# Patient Record
Sex: FEMALE | Race: WHITE
[De-identification: names, ages, dates, MRNs, and addresses within clinical notes are randomized per-mention and may not be internally consistent; named-entity substitution may affect disease eponyms.]

---

## 2018-06-19 ENCOUNTER — HOSPITAL ENCOUNTER (INPATIENT)
Dept: HOSPITAL 36 - ER | Age: 53
LOS: 7 days | Discharge: HOME | DRG: 384 | End: 2018-06-26
Attending: INTERNAL MEDICINE | Admitting: INTERNAL MEDICINE
Payer: MEDICAID

## 2018-06-19 DIAGNOSIS — I47.1: ICD-10-CM

## 2018-06-19 DIAGNOSIS — D68.9: ICD-10-CM

## 2018-06-19 DIAGNOSIS — M62.82: ICD-10-CM
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
## 2018-06-19 DIAGNOSIS — K72.90: ICD-10-CM

## 2018-06-19 DIAGNOSIS — Z91.041: ICD-10-CM

## 2018-06-19 DIAGNOSIS — D69.6: ICD-10-CM
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
## 2018-06-19 DIAGNOSIS — D61.818: ICD-10-CM

## 2018-06-19 DIAGNOSIS — Z91.19: ICD-10-CM

## 2018-06-19 DIAGNOSIS — M19.90: ICD-10-CM

## 2018-06-19 DIAGNOSIS — F10.239: ICD-10-CM

## 2018-06-19 DIAGNOSIS — Z85.05: ICD-10-CM
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
## 2018-06-19 DIAGNOSIS — E87.6: ICD-10-CM

## 2018-06-19 DIAGNOSIS — E72.20: ICD-10-CM

## 2018-06-19 DIAGNOSIS — S01.01XA: Primary | ICD-10-CM

## 2018-06-19 DIAGNOSIS — D64.9: ICD-10-CM

## 2018-06-19 DIAGNOSIS — C22.8: ICD-10-CM
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
## 2018-06-19 DIAGNOSIS — R25.1: ICD-10-CM

## 2018-06-19 DIAGNOSIS — G40.909: ICD-10-CM

## 2018-06-19 DIAGNOSIS — K70.31: ICD-10-CM
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
## 2018-06-19 LAB
ALBUMIN SERPL-MCNC: 4 GM/DL (ref 3.7–5.3)
ALBUMIN/GLOB SERPL: 0.9 {RATIO} (ref 1–1.8)
ALP SERPL-CCNC: 319 U/L (ref 34–104)
ALT SERPL-CCNC: 43 U/L (ref 7–52)
ANION GAP SERPL CALC-SCNC: 25.1 MMOL/L (ref 7–16)
ANISOCYTOSIS BLD QL SMEAR: (no result)
APPEARANCE UR: CLEAR
AST SERPL-CCNC: 190 U/L (ref 13–39)
BACTERIA #/AREA URNS HPF: (no result) /HPF
BILIRUB SERPL-MCNC: 4.9 MG/DL (ref 0.3–1)
BILIRUB UR-MCNC: NEGATIVE MG/DL
BUN SERPL-MCNC: 7 MG/DL (ref 7–25)
CALCIUM SERPL-MCNC: 9.5 MG/DL (ref 8.6–10.3)
CHLORIDE SERPL-SCNC: 94 MEQ/L (ref 98–107)
CK SERPL-CCNC: 480 U/L (ref 30–223)
CK SERPL-CCNC: 584 U/L (ref 30–223)
CO2 SERPL-SCNC: 20.4 MEQ/L (ref 21–31)
COLOR UR: YELLOW
CREAT SERPL-MCNC: 0.7 MG/DL (ref 0.6–1.2)
EPI CELLS URNS QL MICRO: (no result) /LPF
ERYTHROCYTE [DISTWIDTH] IN BLOOD BY AUTOMATED COUNT: 13.9 % (ref 11.5–20)
GLOBULIN SER-MCNC: 4.7 GM/DL
GLUCOSE SERPL-MCNC: 162 MG/DL (ref 70–105)
GLUCOSE UR STRIP-MCNC: NEGATIVE MG/DL
HBA1C MFR BLD: < 4 % (ref 4–6)
HCT VFR BLD CALC: 35.6 % (ref 41–60)
HGB BLD-MCNC: 11.9 GM/DL (ref 12–16)
HGB BLD-MCNC: 13 G/DL (ref 4–35)
INR PPP: 1.57 (ref 0.5–1.4)
KETONES UR STRIP-MCNC: NEGATIVE MG/DL
LEUKOCYTE ESTERASE UR-ACNC: NEGATIVE
LYMPHOCYTES # BLD MANUAL: 11 % (ref 20–50)
MACROCYTES BLD QL SMEAR: (no result)
MANUAL DIFFERENTIAL PERFORMED BLD QL: YES
MCH RBC QN AUTO: 35.4 PG (ref 27–31)
MCHC RBC AUTO-ENTMCNC: 33.3 PG (ref 28–36)
MCV RBC AUTO: 106.3 FL (ref 81–100)
MICRO URNS: YES
NEUTROPHILS NFR BLD AUTO: 86 % (ref 40–80)
NEUTS BAND NFR BLD: 3 % (ref 0–10)
NITRITE UR QL STRIP: NEGATIVE
PH UR STRIP: 8 [PH] (ref 4.6–8)
PLATELET # BLD EST: (no result) 10*3/UL
PLATELET # BLD: 81 TH/CMM (ref 150–400)
PMV BLD AUTO: 6.6 FL
POTASSIUM SERPL-SCNC: 3.5 MEQ/L (ref 3.5–5.1)
PROT UR STRIP-MCNC: (no result) MG/DL
PROTHROMBIN TIME: 16.7 SECONDS (ref 9.5–11.5)
RBC # BLD AUTO: 3.35 MIL/CMM (ref 3.8–5.1)
RBC # UR STRIP: (no result) /UL
RBC #/AREA URNS HPF: (no result) /HPF (ref 0–5)
SODIUM SERPL-SCNC: 136 MEQ/L (ref 136–145)
SP GR UR STRIP: 1.01 (ref 1–1.03)
TOTAL CELLS COUNTED BLD: 100
URINALYSIS COMPLETE PNL UR: (no result)
UROBILINOGEN UR STRIP-ACNC: 0.2 E.U./DL (ref 0.2–1)
WBC # BLD AUTO: 9.6 TH/CMM (ref 4.8–10.8)
WBC #/AREA URNS HPF: (no result) /HPF (ref 0–5)

## 2018-06-19 PROCEDURE — X6488: HCPCS

## 2018-06-19 PROCEDURE — X3904: HCPCS

## 2018-06-19 PROCEDURE — 0HQ0XZZ REPAIR SCALP SKIN, EXTERNAL APPROACH: ICD-10-PCS | Performed by: GENERAL PRACTICE

## 2018-06-19 PROCEDURE — Z7610: HCPCS

## 2018-06-19 RX ADMIN — DEXTROSE AND SODIUM CHLORIDE SCH MLS/HR: 5; .9 INJECTION, SOLUTION INTRAVENOUS at 09:53

## 2018-06-19 RX ADMIN — LACTULOSE SCH GM: 20 SOLUTION ORAL at 17:59

## 2018-06-19 RX ADMIN — HYDROCODONE BITARTRATE AND ACETAMINOPHEN PRN TAB: 5; 325 TABLET ORAL at 17:58

## 2018-06-19 RX ADMIN — HYDROCODONE BITARTRATE AND ACETAMINOPHEN PRN TAB: 5; 325 TABLET ORAL at 08:50

## 2018-06-19 RX ADMIN — LACTULOSE SCH GM: 20 SOLUTION ORAL at 10:19

## 2018-06-19 RX ADMIN — DEXTROSE AND SODIUM CHLORIDE SCH MLS/HR: 5; .9 INJECTION, SOLUTION INTRAVENOUS at 22:44

## 2018-06-19 RX ADMIN — Medication SCH TAB: at 10:19

## 2018-06-19 NOTE — HISTORY & PHYSICAL
ADMIT DATE:  



PATIENT IDENTIFICATION:  A 53-year-old female.



CHIEF COMPLAINT:  Altered level of consciousness.



HISTORY OF PRESENT ILLNESS:  A 52-year-old  female with history of

alcoholic cirrhosis and hepatocellular carcinoma, status post chemotherapy, has

history of alcohol withdrawal seizure as well.  She was brought into the

Emergency Room by neighbor after the patient was noted to have a right temporal

scalp laceration.  The patient was evaluated by Emergency Room MD and noted to

have ____ associated with elevated ammonia level ____, the patient was advised

to be admitted.



PAST MEDICAL HISTORY:  Remarkable for:

1.  Cirrhosis of liver.

2.  Alcohol abuse.

3.  Hepatocellular carcinoma.

4.  Seizure disorder.

5.  History of Clostridium difficile colitis.



MEDICATIONS AT HOME:  She was taking the Flagyl.



ALLERGIES:  The patient is not allergic to any medication.



SOCIAL HISTORY:  The patient resides by herself and she has recently moved from

[place].  The patient has a history of alcohol use, but no smoking cigarette

or illicit drug use.



FAMILY MEDICAL HISTORY:  Remarkable for cancer.



REVIEW OF SYSTEMS:  The patient denies any headache.  Denies any blurred vision,

double vision.  Denies any dysphagia.  Does admit to having excessive sleepiness

and palpitation.  Denies any chest pain.  Denies any abdominal pain.  Denies any

hematemesis.  Denies any seizure or syncopal episode.  Denies any seizure as

long as she remembers, but she does state that she has history of seizure.



PHYSICAL EXAMINATION:

GENERAL:  A 52-year-old looks older than her stated age, lying in the bed, flat

affect.

VITAL SIGNS:  Temperature 98.6, pulse is 100, respiratory rate 20, blood

pressure 141/83.

HEENT:  Normocephalic, atraumatic.  Extraocular muscles intact.  Sclerae with

icterus.  Tongue was pink and dry.  Intact surgical dressing noted on the scalp

area.

NECK:  Supple, no JVD, lymph nodes, or thyromegaly.

HEART:  Both heart sounds are regular.  Tachycardia noted.

CHEST:  Lung equal in expansion with no expiratory wheezing.

ABDOMEN:  Distention.  Bowel sounds noted.  No palpable mass.

EXTREMITIES:  No edema.  Pulses are +1.

NEUROLOGIC:  Alert, awake, follows commands.  Moving upper and lower extremities

with a decreased power.



AVAILABLE DIAGNOSTIC DATA:  White count of 9.6; hemoglobin 11.9; platelet count

of 81,000.  BUN and creatinine is 7 and 0.7.  Bilirubin is 4.9.  AST and ALT

____and 43, alkaline phosphatase is 319.  Ammonia 127, total protein 8.7,

globulin ratio is 0.9.  Urine is unremarkable.  Alcohol level is less than 10. 

EKG has SVT with unifocal PVCs noted.  CT scan of the head was unremarkable.



CLINICAL IMPRESSION:

1.  Most likely alcohol withdrawal seizure with fall.

2.  Status post lacerated wound on the right temple area.

3.  Hepatic encephalopathy.

4.  Alcohol dependence.

5.  Supraventricular tachycardia.

6.  Multiple PVCs.

7.  Thrombocytopenia.

8.  Hepatic encephalopathy.

9.  History of Clostridium difficile colitis.

10.  Mild rhabdomyolysis.

11.  High risk for fall.



PLAN:

1.  Admit this patient to telemetry unit.

2.  Cardiology consultation.

3.  GI and a psych consultation.

4.  IV fluid.

5.  Librium.

6.  Folic acid, multivitamin, thiamine.

7.  Norco for the pain.

8.  Cardiac enzymes.

9.  2D echocardiogram.

10.  Follow lab.

11.  Follow consult recommendation.

12.  Seizure precaution.

13.  IV fluid.

14.  Multivitamin, folic acid and thiamine.

15.  Care plan reviewed and discussed with staff.





DD: 06/19/2018 13:13

DT: 06/19/2018 14:04

JOB# [id number]  [id number]

## 2018-06-19 NOTE — DIAGNOSTIC IMAGING REPORT
Head CT without intravenous contrast



Indication: Trauma



Comparison: None 



Technique: Axial images were obtained from the vertex to the skull base

without IV contrast. Coronal reconstructions were made.   Total DLP:

764,  CTDI42





FINDINGS:



Images of the brain obtained without contrast demonstrate no acute

hemorrhage. No mass lesions identified. The ventricles and basal

cisterns are patent.  The gray-white matter differentiation is

preserved. There is no mass effect or midline shift.  Atherosclerosis is

noted.



No skull fractures identified.  There is mild soft tissue swelling of

the right frontal scalp.  The paranasal sinuses are clear.



IMPRESSION:



No acute intracranial abnormality.



Mild soft tissue swelling of the right frontal scalp.  No evidence of a

skull fracture.



Atherosclerotic vascular disease.

## 2018-06-19 NOTE — ED PHYSICIAN CHART
ED Chief Complaint/HPI





- Patient Information


Date Seen:: 06/19/18


Time Seen:: 01:17


Chief Complaint:: Head trauma


History of Present Illness:: 


51 yo female was brought by neighbour from home to ER for a right temporal 

scalp laceration. Patient could not remember a fall but later recalled possible 

seizure on the floor hitting her head on a furniture. Patient had history of 

alcoholic cirrhosis, hepatocellular carcinoma s/p transarterial 

chemoembolization (Jan 2017), upper GI bleed, coagulopathy, thrombocytopenia, 

and alcohol withdrawal seizure. Patient last drank alcohol two days ago.





ED Review of Systems





- Review of Systems


General/Constitutional: No fever, Chills


Skin: Skin lesions


Head: Headache


Eyes: No pain


ENT: No nasal drainage


Neck: No neck pain


Cardio Vascular: No chest pain


Pulmonary: No SOB


GI: No nausea, No vomiting, Pain


Musculoskeletal: Bone or joint pain, Back pain


Neurological: Syncope





ED Past Medical History





- Past Medical History


Past Medical History: Seizures (alcohol withdrawal seizure), Other (Alcoholic 

cirrhosis, hepatocellular carcinoma s/p TACE (Jan 2017), upper GI bleed, 

coagulopathy, thrombobytopenia)


Social History: Non Smoker, Alcohol, No Drug Use


Surgical History: other (abdominal surgery, transarterial chemoembolization)





Family Medical History





- Family Member


  ** Mother


History Unknown: Yes





ED Physical Exam





- Physical Examination


General/Constitutional: Awake, Alert


Other Gen/Cons comments:: 


In significant distress, shaking and tremor of bilateral upper extremities


Other Head comments:: 


2cm superficial laceration on the right temporal scalp with oozing of venous 

blood


Eyes: PERRL


ENMT: Nasal exam nl


Neck: No nuchal rigidity


Respiratory: No Wheeze/Rhonchi/Rales


Cardio Vascular: RRR, No murmur, gallop, rubs, NL S1 S2


GI: No tenderness/rebounding/guarding


Extremities: normal strength in all extremities


Other Extremities comments:: 


tremor of bilateral upper extremities, low back pain


Neuro/Psych: Alert/oriented, No focal deficits





ED Labs/Radiology/EKG Results





- Lab Results


Results: 


 Laboratory Last Values











WBC  9.6 Th/cmm (4.8-10.8)   06/19/18  01:25    


 


RBC  3.35 Mil/cmm (3.80-5.10)  L  06/19/18  01:25    


 


Hgb  11.9 gm/dL (12-16)  L  06/19/18  01:25    


 


Hct  35.6 % (41.0-60)  L  06/19/18  01:25    


 


MCV  106.3 fl ()  H  06/19/18  01:25    


 


MCH  35.4 pg (27.0-31.0)  H  06/19/18  01:25    


 


MCHC Differential  33.3 pg (28.0-36.0)   06/19/18  01:25    


 


RDW  13.9 % (11.5-20.0)   06/19/18  01:25    


 


Plt Count  81 Th/cmm (150-400)  L  06/19/18  01:25    


 


MPV  6.6 fl  06/19/18  01:25    


 


Band Neutrophils %  3 % (0-10)   06/19/18  01:25    


 


Neutrophils (Manual)  86 % (40-80)  H  06/19/18  01:25    


 


Lymphocytes  11 % (20-50)  L  06/19/18  01:25    


 


Platelet Estimate  SLIGHT DECREASED  (NORMAL)   06/19/18  01:25    


 


Anisocytosis  2+   06/19/18  01:25    


 


Macrocytosis  2+   06/19/18  01:25    


 


PT  16.7 SECONDS (9.5-11.5)  H  06/19/18  01:25    


 


INR  1.57  (0.5-1.4)  H  06/19/18  01:25    


 


PTT (Actin FS)  33.0 SECONDS (26.0-38.0)   06/19/18  01:25    


 


Sodium  136 mEq/L (136-145)   06/19/18  01:25    


 


Potassium  3.5 mEq/L (3.5-5.1)   06/19/18  01:25    


 


Chloride  94 mEq/L ()  L  06/19/18  01:25    


 


Carbon Dioxide  20.4 mEq/L (21.0-31.0)  L  06/19/18  01:25    


 


Anion Gap  25.1  (7.0-16.0)  H  06/19/18  01:25    


 


BUN  7 mg/dL (7-25)   06/19/18  01:25    


 


Creatinine  0.7 mg/dL (0.6-1.2)   06/19/18  01:25    


 


Est GFR ( Amer)  > 60.0 ml/min (>90)   06/19/18  01:25    


 


Est GFR (Non-Af Amer)  > 60.0 ml/min  06/19/18  01:25    


 


BUN/Creatinine Ratio  10.0   06/19/18  01:25    


 


Glucose  162 mg/dL ()  H  06/19/18  01:25    


 


Calcium  9.5 mg/dL (8.6-10.3)   06/19/18  01:25    


 


Total Bilirubin  4.9 mg/dL (0.3-1.0)  H  06/19/18  01:25    


 


AST  190 U/L (13-39)  H  06/19/18  01:25    


 


ALT  43 U/L (7-52)   06/19/18  01:25    


 


Alkaline Phosphatase  319 U/L ()  H  06/19/18  01:25    


 


Ammonia  127 umol/L (16-53)  H  06/19/18  01:25    


 


Total Protein  8.7 gm/dL (6.0-8.3)  H  06/19/18  01:25    


 


Albumin  4.0 gm/dL (3.7-5.3)   06/19/18  01:25    


 


Globulin  4.7 gm/dL  06/19/18  01:25    


 


Albumin/Globulin Ratio  0.9  (1.0-1.8)  L  06/19/18  01:25    


 


Ethyl Alcohol  < 10 mg/dL (0-10)   06/19/18  01:25    


 


Blood Type  O POSITIVE   06/19/18  01:25    


 


Antibody Screen  NEGATIVE   06/19/18  01:25    














- Radiology Results


Results: 


CT head without contrast: no acute intracranial hemorrhage, midline shift or 

mass effect








- EKG Interpretations


EKG Time:: 01:31


Rate & Rhythm: 151, sinus tachycardia with multiple premature ventricular and 

supraventric


Axis: LVH


Intervals: CT 93, QRSD 91


Comments:: 


Non specific ST-T changes





ED Assessment





- Assessment


General Assessment: 


Syncope vs alcohol withdrawal seizure


Right scalp laceration


Tachycardia


Macrocytic anemia


Thrombocytopenia


Coagulopathy


Hyperammonemia secondary to liver cirrhosis





Assessment/Comments:: 


CBC, CMP, PT/PTT, ammonia, etoh level


CT head without contrast


Laceration repair


NS 1L IV bolus


Zofran 4mg IV


Lactulose 30g PO


Librium 25mg PO


Metoprolol 25mg po


Metoprolol 10mg IV


Toradol 30mg IV


Tylenol 650mg PO


Admit to telemetry for further evaluation.





Location:: 


Right temporal laceration 


Laceration Type:: Simple


Wound Length: 2 cm


Prep/Irrigation:: 


NS, hydrogen peroxide, betadine


Comments: 


After 1% lidocaine with epinephrine was injected to achieve local anesthesia, 3 

simple interrupted sutures was placed with with 4.0 Prolene and hemostasis was 

achieved. Bacitracin was applied topically at the wound. Patient tolerated 

procedure well. 





ED Septic Shock





- .


Is Septic Shock (SBP<90, OR Lactate>4 mmol\L) present?: No





ED Reassessment (Disposition)





- Reassessment


Reassessment:: 


After metoprolol 25mg PO and metoprolol 10mg IV, patient's tachycardia was 

later converted to sinus rhythm.


Reassessment Condition:: Improved





- Patient Disposition


Discharge/Transfer:: Acute Care w/in this hosp


Admitting Medical Physician:: Trav Sam





ED Discharge Plan





- Patient Disposition


Admit/Discharge/Transfer: Acute Care w/in this hosp


Condition at Disposition: Stable

## 2018-06-19 NOTE — HISTORY AND PHYSICAL
History of Present Illness





- [facility]


Vital Signs: 





 Last Vital Signs











Temp  96.8 F   06/19/18 06:49


 


Pulse  81   06/19/18 06:49


 


Resp  20   06/19/18 06:49


 


BP  137/76   06/19/18 06:49


 


Pulse Ox  95   06/19/18 06:49














Family Medical History





- Family Member


  ** Mother


History Unknown: Yes





- Medications


Home Medications: 


Home Medication











 Medication  Instructions  Recorded  Type


 


metroNIDAZOLE [Flagyl] 500 mg PO TID 06/19/18 History














- Allergies


Allergies/Adverse Reactions: 


 Allergies











Allergy/AdvReac Type Severity Reaction Status Date / Time


 


contrast Allergy Unknown  Uncoded 06/19/18 01:17














- Lab Results


All Lab Results last 24 hours: 





 Laboratory Results - last 24 hr











  06/19/18 06/19/18 06/19/18





  01:25 01:25 01:25


 


WBC  9.6  


 


RBC  3.35 L  


 


Hgb  11.9 L  


 


Hct  35.6 L  


 


MCV  106.3 H  


 


MCH  35.4 H  


 


MCHC Differential  33.3  


 


RDW  13.9  


 


Plt Count  81 L  


 


MPV  6.6  


 


Band Neutrophils %  3  


 


Neutrophils (Manual)  86 H  


 


Lymphocytes  11 L  


 


Platelet Estimate  SLIGHT DECREASED  


 


Anisocytosis  2+  


 


Macrocytosis  2+  


 


PT   16.7 H 


 


INR   1.57 H 


 


PTT (Actin FS)   33.0 


 


Sodium    136


 


Potassium    3.5


 


Chloride    94 L


 


Carbon Dioxide    20.4 L


 


Anion Gap    25.1 H


 


BUN    7


 


Creatinine    0.7


 


Est GFR ( Amer)    > 60.0


 


Est GFR (Non-Af Amer)    > 60.0


 


BUN/Creatinine Ratio    10.0


 


Glucose    162 H


 


Calcium    9.5


 


Total Bilirubin    4.9 H


 


AST    190 H


 


ALT    43


 


Alkaline Phosphatase    319 H


 


Ammonia   


 


Total Protein    8.7 H


 


Albumin    4.0


 


Globulin    4.7


 


Albumin/Globulin Ratio    0.9 L


 


Urine Source   


 


Urine Color   


 


Urine Clarity   


 


Urine pH   


 


Ur Specific Gravity   


 


Urine Protein   


 


Urine Glucose (UA)   


 


Urine Ketones   


 


Urine Blood   


 


Urine Nitrate   


 


Urine Bilirubin   


 


Urine Urobilinogen   


 


Ur Leukocyte Esterase   


 


Urine RBC   


 


Urine WBC   


 


Ur Epithelial Cells   


 


Urine Bacteria   


 


Ethyl Alcohol    < 10


 


Blood Type   


 


Antibody Screen   














  06/19/18 06/19/18 06/19/18





  01:25 01:25 06:00


 


WBC   


 


RBC   


 


Hgb   


 


Hct   


 


MCV   


 


MCH   


 


MCHC Differential   


 


RDW   


 


Plt Count   


 


MPV   


 


Band Neutrophils %   


 


Neutrophils (Manual)   


 


Lymphocytes   


 


Platelet Estimate   


 


Anisocytosis   


 


Macrocytosis   


 


PT   


 


INR   


 


PTT (Actin FS)   


 


Sodium   


 


Potassium   


 


Chloride   


 


Carbon Dioxide   


 


Anion Gap   


 


BUN   


 


Creatinine   


 


Est GFR ( Amer)   


 


Est GFR (Non-Af Amer)   


 


BUN/Creatinine Ratio   


 


Glucose   


 


Calcium   


 


Total Bilirubin   


 


AST   


 


ALT   


 


Alkaline Phosphatase   


 


Ammonia  127 H  


 


Total Protein   


 


Albumin   


 


Globulin   


 


Albumin/Globulin Ratio   


 


Urine Source    RANDOM


 


Urine Color    YELLOW


 


Urine Clarity    CLEAR


 


Urine pH    8.0


 


Ur Specific Gravity    1.015


 


Urine Protein    TRACE


 


Urine Glucose (UA)    NEGATIVE


 


Urine Ketones    NEGATIVE


 


Urine Blood    TRACE


 


Urine Nitrate    NEGATIVE


 


Urine Bilirubin    NEGATIVE


 


Urine Urobilinogen    0.2


 


Ur Leukocyte Esterase    NEGATIVE


 


Urine RBC    2-5


 


Urine WBC    0-2


 


Ur Epithelial Cells    FEW


 


Urine Bacteria    OCCASIONAL


 


Ethyl Alcohol   


 


Blood Type   O POSITIVE 


 


Antibody Screen   NEGATIVE 














- Assessment


Assessment: 





 Current Active Problems











Problem Status Onset


 


Alcohol abuse Acute 


 


Fall at home Acute

## 2018-06-20 LAB
ALBUMIN SERPL-MCNC: 3.4 GM/DL (ref 3.7–5.3)
ALBUMIN/GLOB SERPL: 0.9 {RATIO} (ref 1–1.8)
ALP SERPL-CCNC: 224 U/L (ref 34–104)
ALT SERPL-CCNC: 31 U/L (ref 7–52)
ANION GAP SERPL CALC-SCNC: 12 MMOL/L (ref 7–16)
AST SERPL-CCNC: 126 U/L (ref 13–39)
BILIRUB SERPL-MCNC: 4.4 MG/DL (ref 0.3–1)
BUN SERPL-MCNC: 7 MG/DL (ref 7–25)
CALCIUM SERPL-MCNC: 8.4 MG/DL (ref 8.6–10.3)
CHLORIDE SERPL-SCNC: 99 MEQ/L (ref 98–107)
CK SERPL-CCNC: 405 U/L (ref 30–223)
CO2 SERPL-SCNC: 25.9 MEQ/L (ref 21–31)
CREAT SERPL-MCNC: 0.7 MG/DL (ref 0.6–1.2)
ERYTHROCYTE [DISTWIDTH] IN BLOOD BY AUTOMATED COUNT: 14 % (ref 11.5–20)
GLOBULIN SER-MCNC: 3.7 GM/DL
GLUCOSE SERPL-MCNC: 135 MG/DL (ref 70–105)
HCT VFR BLD CALC: 32 % (ref 41–60)
HGB BLD-MCNC: 10.6 GM/DL (ref 12–16)
LYMPHOCYTES # BLD MANUAL: 10 % (ref 20–50)
MACROCYTES BLD QL SMEAR: (no result)
MANUAL DIFFERENTIAL PERFORMED BLD QL: YES
MCH RBC QN AUTO: 35.1 PG (ref 27–31)
MCHC RBC AUTO-ENTMCNC: 33 PG (ref 28–36)
MCV RBC AUTO: 106.3 FL (ref 81–100)
MONOCYTES # BLD MANUAL: 4 % (ref 2–10)
NEUTROPHILS NFR BLD AUTO: 85 % (ref 40–80)
NEUTS BAND NFR BLD: 1 % (ref 0–10)
PLATELET # BLD EST: (no result) 10*3/UL
PLATELET # BLD: 60 TH/CMM (ref 150–400)
PMV BLD AUTO: 7 FL
POTASSIUM SERPL-SCNC: 2.9 MEQ/L (ref 3.5–5.1)
RBC # BLD AUTO: 3.01 MIL/CMM (ref 3.8–5.1)
SODIUM SERPL-SCNC: 134 MEQ/L (ref 136–145)
TOTAL CELLS COUNTED BLD: 100
WBC # BLD AUTO: 8.3 TH/CMM (ref 4.8–10.8)

## 2018-06-20 RX ADMIN — LACTULOSE SCH: 20 SOLUTION ORAL at 13:43

## 2018-06-20 RX ADMIN — Medication SCH TAB: at 08:04

## 2018-06-20 RX ADMIN — MAGNESIUM SULFATE IN WATER SCH MLS/HR: 40 INJECTION, SOLUTION INTRAVENOUS at 20:02

## 2018-06-20 RX ADMIN — MAGNESIUM SULFATE IN WATER SCH MLS/HR: 40 INJECTION, SOLUTION INTRAVENOUS at 18:06

## 2018-06-20 RX ADMIN — LACTULOSE SCH: 20 SOLUTION ORAL at 21:36

## 2018-06-20 RX ADMIN — LACTULOSE SCH GM: 20 SOLUTION ORAL at 08:04

## 2018-06-20 NOTE — CONSULTATION
DATE OF CONSULTATION:  06/20/2018



PSYCHIATRIC CONSULT



PATIENT'S AGE:  52-year-old.



SEX:  Female.



PHYSICIAN:  Dr. Sam.



CONSULTANT:  Dr. Jacobson.



TYPE OF THE REPORT:  Psychiatric consult.



REASON FOR THE CONSULT:  Confusion and agitation.



HISTORY OF PRESENT ILLNESS:  The patient is a 52-year-old female who was

admitted to the hospital because of altered level of conscious.  The patient has

history of alcohol cirrhosis.  The patient also has hepatocellular carcinoma. 

The patient also has history of seizure disorder related to alcohol. 

Apparently, the patient is still drinking and last drink was 2 days prior to her

admission when she drank unknown amount of vodka according to the report from

her  to the staff.  The patient currently is confused.  She is actively

hallucinating and actively responding to stimuli and talking to imaginary

objects.  She also is restless and tried to get off the bed.



PAST PSYCHIATRIC HISTORY:  The patient has a problem with alcoholism.



PAST MEDICAL HISTORY:  The patient, as mentioned above, has a problem with liver

_____.



SOCIAL HISTORY:  The patient is .  Unable to get much information except

the patient has been drinking heavily.



MENTAL STATUS EXAM:  The patient appears her stated age.  Irritable mood. 

Anxious.  Flat affect.  Thought processes was rambling and the patient is

confused, answer questions.  The patient did not answer question regarding

suicide or homicide.  The patient is alert, but seems to be disoriented to time,

place, person and situation.  Unable to assess her memory orientation at this

time because the patient is delirium and confused.



ASSESSMENT:

PRIMARY DIAGNOSIS:  Delirium tremens.



SECONDARY DIAGNOSIS:  Alcohol use disorder.  Alcohol hallucinosis.



MEDICAL DIAGNOSIS:  Rule out head trauma.  Rule out intracranial hemorrhage.



TREATMENT PLAN:  We will monitor the patient's behavior closely.  We will work

on her psychosis and her delirium.  Also, we will add Haldol and increase

Ativan.  Also, recommend CAT scan of the head.



Thanks to Dr. Sam and we will follow with you.





DD: 06/20/2018 07:31

DT: 06/20/2018 08:57

JOB# [id number]  [id number]

## 2018-06-20 NOTE — CONSULTATION
DATE OF CONSULTATION:  06/19/2018



REASON FOR CONSULTATION:  Abnormal liver enzyme.



HISTORY OF PRESENT ILLNESS:  This consult was obtained through the request of

Dr. Sam for this 52-year-old with history of liver cancer due to cirrhosis

from alcoholism who presented to the ER, brought in by a neighbor for head

trauma after a fall.  She was admitted and was found to have abnormal liver

enzymes, so GI consult was called in for further evaluation.  The patient is a

poor historian.  She cannot remember much about the fall.  She denies drinking.



PAST MEDICAL HISTORY:  Liver cancer.



PAST SURGICAL HISTORY:  She had TACE as a procedure done for liver tumors.



SOCIAL HISTORY:  Denies smoking or drugs.  Denies alcohol, but admits she was

drinking in the past.  By discussion by the center where she was treated, it

seems she was still drinking at that time and she came, seems to be under the

influence.



FAMILY HISTORY:  Noncontributory.



ALLERGIES:  No known drug allergies.



MEDICATIONS:  Norco, Librium, folic acid, lactulose, Ativan, and vitamin B1.



REVIEW OF SYSTEMS:  Unobtainable.



PHYSICAL EXAMINATION:

GENERAL:  The patient is awake, oriented to self, and in mild distress.

VITAL SIGNS:  Blood pressure is 141/83, heart rate 81, respiratory rate 18, and

temperature is 98.4.

HEAD AND NECK:  Pupils reactive to light.  Extraocular muscles could not be

tested.  The head is covered in bandage.  Oral cavity, no lesion.

NECK:  Supple.

CHEST:  Good air entry.

LUNGS:  Clear to auscultation.

CARDIOVASCULAR:  Regular rate and rhythm.  No murmur or gallop.

ABDOMEN:  Soft, positive bowel sounds.  Abdomen was not tender.

EXTREMITIES:  Lower extremities, no edema.

CENTRAL NERVOUS SYSTEM:  Grossly nonfocal.



LABORATORY DATA:  Alcohol was less than 10.  CPK was 584.  Albumin is 4,

bilirubin 4.9, , and ALT 43.  PT 16.7 seconds.  White count 9.6 and H and

H are 11.9 and 35.6 with platelets of 81.



IMPRESSION:  A 52-year-old with history of alcohol abuse, history of cirrhosis,

and history of liver cancer, now presenting with head trauma and abnormal liver

enzymes.



ASSESSMENT AND PLAN:  Abnormal liver enzyme.  This picture is consistent with

the liver tumor with cirrhosis with ongoing alcohol use.  Discussed with _____

at [facility].  She is not a candidate right now for anymore TACE, but

if she stopped drinking and liver numbers are better, she can be done.  So, we

will talk with the patient about sobriety and again rehab, joining [facility], etc., and then ____ but at this time doing any more study is not

going to change much of the plan.

2.  Cirrhosis, as above.

3.  Alcohol abuse, as above.

4.  Other medical problems such as head trauma, etc., as per Dr. Sam.



Thank you, Dr. Sam, for allowing me to participate in the care of the

patient.  If you have any further questions, please let me know.





DD: 06/19/2018 13:40

DT: 06/20/2018 05:21

JOB# [id number]  [id number]

## 2018-06-20 NOTE — CONSULTATION
DATE OF CONSULTATION:  06/19/2018



The patient of Dr. Sam.



HISTORY AND PHYSICAL:  This is a 52-year-old female patient who had a fall with

laceration on the scalp.  Following this, the patient was brought to the

Emergency Room, the patient had seizure activities secondary to alcohol

withdrawal.  During the hospital stay, the patient had supraventricular

tachycardia and hence Cardiology consult was requested.



PAST MEDICAL HISTORY:  Alcohol dependence, seizure disorder, cirrhosis of liver,

hepatocellular carcinoma with chemotherapy and surgery, thrombocytopenia,

hepatic encephalopathy, rhabdomyolysis.



FAMILY HISTORY:  Unremarkable.



SOCIAL HISTORY:  The patient has a history of alcohol abuse.



ALLERGIES:  None.



PHYSICAL EXAMINATION:

VITAL SIGNS:  Blood pressure 130/80, pulse 140, respirations 28.

HEAD:  The patient has laceration on the scalp.

EYES:  Pupils equal, reactive to light.  Fundi show AV nicking, sclerae white,

conjunctivae pink.

NECK:  Carotid 2+.  Normal upstroke.  JVD flat.  Thyroid not palpable.  Lymph

nodes not palpable.

CHEST:  Shows increased AP diameter.  No kyphosis, scoliosis.

LUNGS:  Bilateral bronchovesicular breath sounds.

HEART:  PMI fifth intercostal space with lateral to midclavicular line.  S1, S2,

S3, S4.  S1 irregular.  Systolic murmur, grade 2/6, lower left sternal border

with_____ radiation.

ABDOMEN:  Soft.  Liver, spleen not palpable.

EXTREMITIES:  Peripheral pulses 2+.  No pedal edema.

NEUROLOGIC:  The patient has seizure activity and alcohol withdrawal.



CLINICAL IMPRESSION:  Supraventricular tachycardia, headache secondary to fall

with head injury, laceration to the scalp, alcohol dependence, seizure disorder,

cirrhosis of liver, hepatocellular carcinoma with chemotherapy and surgery,

thrombocytopenia, hepatic encephalopathy, rhabdomyolysis.



PLAN:  The patient to control the heart rate.  We will hold off anticoagulation

with recent head injury as well as thrombocytopenia and alcohol dependence and

monitor the patient closely.  We will get echocardiogram.





DD: 06/20/2018 18:40

DT: 06/20/2018 18:51

JOB# [id number]  [id number]

## 2018-06-20 NOTE — DIAGNOSTIC IMAGING REPORT
Head CT without intravenous contrast



Indication: Altered level of consciousness



Comparison: Head CT on 6/19/2018 



Technique: Axial images were obtained from the vertex to the skull base

without IV contrast. Coronal reconstructions were made.   Total DLP:

710,  CTDI37



FINDINGS:



Images of the brain obtained without contrast demonstrate small area of

high density seen along the anterior falcine region measuring 2 mm in

greatest transverse dimension.  No intraparenchymal hemorrhage

identified.  No mass effect or midline shift.  Atrophy is noted.  



There is diffuse soft tissue swelling throughout the scalp bilaterally. 

This is increased since prior exam.  Diffuse left preorbital and left

facial soft tissue swelling is noted.  The orbits and intraconal

compartments are intact No evidence of a skull fracture.



There is mucosal thickening in the paranasal sinuses.



IMPRESSION:



Small area of high density along the anterior falcine region measuring

up to 2 mm in greatest transverse dimension.  Findings may present

falcine calcifications.  A small focus of falcine hemorrhage is

considered less likely but cannot be completely excluded..  A follow-up

CT in 24 to 48 hours would provide additional detail and assessment.



Diffuse scalp soft tissue swelling and edema  now extending to the left

side and involving the left facial and left preseptal regions.  Findings

may be due to infectious process.  Clinical correlation and follow-up is

recommended.  



Atrophy.



Final results were administered to the referring team on 6/20/2018 at

12:13 PM.

## 2018-06-21 LAB
ALBUMIN SERPL-MCNC: 3.3 GM/DL (ref 3.7–5.3)
ALBUMIN/GLOB SERPL: 0.9 {RATIO} (ref 1–1.8)
ALP SERPL-CCNC: 202 U/L (ref 34–104)
ALT SERPL-CCNC: 28 U/L (ref 7–52)
ANION GAP SERPL CALC-SCNC: 11.3 MMOL/L (ref 7–16)
AST SERPL-CCNC: 95 U/L (ref 13–39)
BASOPHILS # BLD AUTO: 0 TH/CUMM (ref 0–0.2)
BASOPHILS NFR BLD AUTO: 0.7 % (ref 0–2)
BILIRUB SERPL-MCNC: 4.6 MG/DL (ref 0.3–1)
BUN SERPL-MCNC: 7 MG/DL (ref 7–25)
CALCIUM SERPL-MCNC: 9.3 MG/DL (ref 8.6–10.3)
CHLORIDE SERPL-SCNC: 103 MEQ/L (ref 98–107)
CO2 SERPL-SCNC: 25.2 MEQ/L (ref 21–31)
CREAT SERPL-MCNC: 0.7 MG/DL (ref 0.6–1.2)
EOSINOPHIL # BLD AUTO: 0.1 TH/CMM (ref 0.1–0.4)
EOSINOPHIL NFR BLD AUTO: 0.8 % (ref 0–5)
ERYTHROCYTE [DISTWIDTH] IN BLOOD BY AUTOMATED COUNT: 14 % (ref 11.5–20)
GLOBULIN SER-MCNC: 3.7 GM/DL
GLUCOSE SERPL-MCNC: 102 MG/DL (ref 70–105)
HCT VFR BLD CALC: 28 % (ref 41–60)
HGB BLD-MCNC: 9.3 GM/DL (ref 12–16)
LYMPHOCYTE AB SER FC-ACNC: 0.9 TH/CMM (ref 1.5–3)
LYMPHOCYTES NFR BLD AUTO: 12.1 % (ref 20–50)
MAGNESIUM SERPL-MCNC: 2 MG/DL (ref 1.9–2.7)
MCH RBC QN AUTO: 35 PG (ref 27–31)
MCHC RBC AUTO-ENTMCNC: 33.4 PG (ref 28–36)
MCV RBC AUTO: 104.9 FL (ref 81–100)
MONOCYTES # BLD AUTO: 0.8 TH/CMM (ref 0.3–1)
MONOCYTES NFR BLD AUTO: 11.3 % (ref 2–10)
NEUTROPHILS # BLD: 5.5 TH/CMM (ref 1.8–8)
NEUTROPHILS NFR BLD AUTO: 75.1 % (ref 40–80)
PLATELET # BLD: 66 TH/CMM (ref 150–400)
PMV BLD AUTO: 7.4 FL
POTASSIUM SERPL-SCNC: 3.5 MEQ/L (ref 3.5–5.1)
RBC # BLD AUTO: 2.67 MIL/CMM (ref 3.8–5.1)
SODIUM SERPL-SCNC: 136 MEQ/L (ref 136–145)
WBC # BLD AUTO: 7.3 TH/CMM (ref 4.8–10.8)

## 2018-06-21 RX ADMIN — LACTULOSE SCH GM: 20 SOLUTION ORAL at 16:07

## 2018-06-21 RX ADMIN — LACTULOSE SCH GM: 20 SOLUTION ORAL at 08:59

## 2018-06-21 RX ADMIN — Medication SCH TAB: at 09:00

## 2018-06-21 RX ADMIN — LACTULOSE SCH: 20 SOLUTION ORAL at 22:01

## 2018-06-21 NOTE — PROGRESS NOTES
DATE:  06/20/2018



IDENTIFICATION:  A 52-year-old female.



SUBJECTIVE:  The patient seen and examined.  The patient is extremely confused

and yesterday's mental status:  The patient is alert and talking nonsense at

this time, there was big change since yesterday.  The patient has been seen by

gastroenterologist and psychiatrist as well.  The patient had 2 loose bowel

movements yesterday as well.  The patient's left upper eyelids are swollen where

patient had a suture done on the left parietal area.  The patient remained

hemodynamically stable, though.



OBJECTIVE:

VITAL SIGNS:  Temperature is 98.7, pulse 96, respiratory rate is 20, blood

pressure 137/77.

HEENT:  Remarkable for a scalp hematoma noted with the left upper eyelid

swollen.  Sclerae with icterus.  Tongue was pink and dry.

NECK:  Supple, no JVD.

HEART:  Regular.

CHEST:  Lung equal in expansion, no expiratory wheezing.

ABDOMEN:  Soft.  Bowel sounds are present.

EXTREMITIES:  No edema.



AVAILABLE DIAGNOSTIC DATA:  Hemoglobin 10.6, platelet count of 60,000. 

Potassium 2.9, BUN and creatinine 7 and 0.7.  AST, ALT is 126 and 31 with

alkaline phosphatase of 224.  Troponin is negative.  Cardiac enzymes, CPK is

405.  Urine was negative yesterday.  CT head was done yesterday, which was

unremarkable.



CLINICAL IMPRESSION:

1.  Altered mental status, etiology needs to determine, most likely metabolic

encephalopathy.

2.  Hypokalemia.

3.  Status post fall after alcohol withdrawal seizure and has a sutured wound.

4.  Thrombocytopenia secondary to alcoholic cirrhosis.

5.  Microcytic anemia.

6.  Cirrhosis of liver.

7.  History of liver cancer.

8.  Noncompliance and continued to drink alcohol.

9.  SVT resolved.



PLAN:  The patient is admitted at this time to telemetry unit.  Potassium has

been replaced.  I will get the stat CT scan of the head to rule out any

intracranial pathology.  The patient will have cardiac monitoring as well.  The

patient is currently receiving Librium, which is may be a contributing factor. 

We will cut down the Librium as well, optimize the lactulose.  Check the ammonia

level as well.  Continue hydration and follow the consultant's recommendation as

well.  Await further testing of her test results as well.  Care plan has been

reviewed and discussed with staff as well.





DD: 06/20/2018 10:08

DT: 06/21/2018 00:17

JOB# [id number]  [id number]

## 2018-06-21 NOTE — CARDIOLOGY
06/19/2018



The patient of Dr. Sam.



M-MODE ECHOCARDIOGRAM:  Mitral valve, anterior leaflet of mitral valve shows

normal excursion, EF velocity.  Posterior leaflet of mitral valve shows normal

excursion.  Left ventricular posterior wall shows increased thickness, normal

excursion.  Interventricular septum shows increased thickness, normal excursion,

hypertrophy of the left ventricle, ejection fraction 55%.  Left atrium normal. 

Aortic root shows normal dimension, normal excursion of aortic leaflets.



CONCLUSION:  Hypertrophy of the left ventricle, ejection fraction 55%.



2D ECHO ON THE SAME PATIENT:  Long axis view showed normal-sized left ventricle

with hypertrophy of the left ventricle.  Left atrium normal.  Aortic root shows

normal dimension, normal excursion of aortic leaflets.  Short axis view of

mitral valve normal.  Short axis view of aortic valve normal.  Apical four

chamber view showed normal-sized left ventricle with hypertrophy of the left

ventricle.  Left atrium enlarged.  Right ventricular cavity, right atrium

normal, no pericardial effusion.



CONCLUSION:  Hypertrophy of the left ventricle, ejection fraction 55%.



Doppler study shows mild mitral regurgitation, mild tricuspid regurgitation.





DD: 06/21/2018 11:57

DT: 06/21/2018 12:15

JOB# [id number]  [id number]

## 2018-06-22 RX ADMIN — DEXTROSE AND SODIUM CHLORIDE SCH MLS/HR: 5; .9 INJECTION, SOLUTION INTRAVENOUS at 18:11

## 2018-06-22 RX ADMIN — Medication SCH TAB: at 09:10

## 2018-06-22 RX ADMIN — LACTULOSE SCH GM: 20 SOLUTION ORAL at 09:10

## 2018-06-22 RX ADMIN — LACTULOSE SCH GM: 20 SOLUTION ORAL at 20:41

## 2018-06-22 RX ADMIN — LACTULOSE SCH GM: 20 SOLUTION ORAL at 14:46

## 2018-06-22 NOTE — PROGRESS NOTES
DATE:  



CHIEF COMPLAINT:  A 52-year-old female.  The patient is seen and examined.  The

patient is lying in the bed.  The patient is more alert, awake, and oriented. 

The patient wants to go home.  The patient denies any chest pain, shortness of

breath, or palpitation.



PHYSICAL EXAMINATION:

VITAL SIGNS:  Temperature 98.6, pulse is 62, respiratory rate 18, and blood

pressure 130/70.

HEENT:  No facial asymmetry.

NECK:  Supple, no JVD.

HEART:  Regular.

CHEST:  Lungs are equal in expansion.  No wheezing and no crackles.

ABDOMEN:  Soft.  No guarding and no rigidity.  Bowel sounds present.  No

palpable mass.

EXTREMITIES:  No edema.

NEUROLOGIC:  Alert, awake, and follows command.



CLINICAL IMPRESSION:

1.  End-stage liver disease.

2.  Liver cancer.

3.  Alcohol dependence.

4.  Psychotic disorder.



PLAN:  After discussion with the patient about terminal diagnosis of liver

cancer and cirrhosis of liver ____, I did discuss with the patient about the

aggressive therapy versus palliative and hospice care.  After explaining pros

and cons of both the treatment plan, the patient has decided to opt for

palliative and hospice care.  At this time, we will have palliative and hospice

care evaluation and we will make further decision.





DD: 06/21/2018 10:41

DT: 06/22/2018 02:30

JOB# [id number]  [id number]

## 2018-06-22 NOTE — PROGRESS NOTES
DATE:  06/21/2018



SUBJECTIVE:  Chart reviewed and the patient interviewed.  Also discussed the

patient's condition with the staff and reviewed records and labs.  The patient

seems to be having brighter affect and less agitated.  The patient also is

interacting more.  The patient admitted to her drinking problem.  Currently, the

patient is cooperative and seems to be less delirious, although she still has

some residual.



ASSESSMENT:  The patient is still delirious and going through withdrawal.  The

patient is showing some improvement.



PLAN:  We will continue detox and monitor her behavior and continue current

medications and followup.  Also, the patient will need long-term rehabilitation.





DD: 06/21/2018 07:47

DT: 06/22/2018 02:15

JOB# [id number]  [id number]

## 2018-06-22 NOTE — PROGRESS NOTES
DATE:  



SUBJECTIVE:  Chart reviewed and the patient interviewed.  Also discussed the

patient's condition with the staff and reviewed records and labs.  The patient's

affect is brighter.  The patient continues to show improvement.  She is alert

and oriented to time, place, person and situation.  "I have stopped drinking."

 The patient is motivated to stop drinking, but at the same time, she is still

anxious.  She is interacting appropriately.  She denies any intention to harm

herself or others.  She continued to comply with medications with no side

effects of medications.  Also, decreased symptoms and signs of withdrawal.



ASSESSMENT:  The patient is less confused and less delusional.



TREATMENT PLAN:  Continue monitoring her behavior.  Also, continue to work on

her drinking.  The patient is not suicidal or homicidal, and can be discharged

home when medically stable with plans for outpatient treatment and

rehabilitation or the patient can go directly to an inpatient rehabilitation

program.





DD: 06/22/2018 08:00

DT: 06/22/2018 09:05

[facility]# [id number]  [id number]

## 2018-06-23 RX ADMIN — LACTULOSE SCH GM: 20 SOLUTION ORAL at 10:26

## 2018-06-23 RX ADMIN — LACTULOSE SCH: 20 SOLUTION ORAL at 21:22

## 2018-06-23 RX ADMIN — Medication SCH TAB: at 10:17

## 2018-06-23 RX ADMIN — LACTULOSE SCH GM: 20 SOLUTION ORAL at 14:52

## 2018-06-23 RX ADMIN — DEXTROSE AND SODIUM CHLORIDE SCH MLS/HR: 5; .9 INJECTION, SOLUTION INTRAVENOUS at 06:34

## 2018-06-23 NOTE — PROGRESS NOTES
DATE:  06/23/2018



IDENTIFICATION:  A 52-year-old female.  



The patient seen and examined.  The patient is lying in the bed.  The patient

has no new event.  Awaiting safe disposition at home.  Discussed with staff. 

There is no new event reported.  The patient has tried to ambulate, though

patient has not walked since the patient is admitted in the hospital.



PHYSICAL EXAMINATION:

VITAL SIGNS:  Temperature 99.6, pulse is 93, respiratory rate 18, blood pressure

134/70.

HEENT:  No facial asymmetry.

NECK:  Supple, no JVD.

HEART:  Regular.

LUNGS:  Clear.

ABDOMEN:  Soft.

EXTREMITIES:  No edema.



CLINICAL IMPRESSION:

1.  Alcohol withdrawal seizure.

2.  Cirrhosis of liver.

3.  Hepatocellular carcinoma.

4.  Alcohol dependence.

5.  Underlying depression.

6.  Debility.  



PLAN:  Start physical therapy for now.  Continue other medicines as prescribed. 

Follow lab.  Follow consultant recommendations.  Discharge plan based on the

patient's progress and safe discharge at home.





DD: 06/23/2018 12:45

DT: 06/23/2018 17:35

[facility]# [id number]  [id number]

## 2018-06-23 NOTE — PROGRESS NOTES
DATE:  06/22/2018



SUBJECTIVE:  The patient seen and examined.  The patient is lying in the bed,

more alert and awake.  Discussion with discharge planning.  The patient noted to

have emergency medical hospice is not covered.  The patient is not suicidal or

homicidal.  The patient is less confused.  The patient is able to eat fairly

well.



OBJECTIVE:  On exam,

VITAL SIGNS:  See nurse's note.

HEENT:  Remarkable for icterus with intact dressing noted.

NECK:  Supple.  No JVD.

HEART:  Regular.

CHEST AND LUNG:  Equal in expansion.

LUNGS:  No wheezing, no crackles.

ABDOMEN:  Soft.

EXTREMITIES:  No edema.



AVAILABLE DIAGNOSTIC DATA:  Has been reviewed.



CLINICAL IMPRESSION:

1.  Alcohol dependence.

2.  Cirrhosis of liver.

3.  History of liver cancer.

4.  Thrombocytopenia.

5.  Macrocytic anemia.

6.  Electrolyte imbalance, corrected.

7.  Encephalopathy, improving.

8.  Underlying depression.



PLAN:  Continue current medicine as prescribed.  Follow lab and consult on the

recommendation.  Possible discharge to go home, once the patient has structured

family structured support system outside of the hospital.





DD: 06/22/2018 09:26

DT: 06/23/2018 00:13

JOB# [id number]  [id number]

## 2018-06-24 LAB
ALBUMIN SERPL-MCNC: 2.9 GM/DL (ref 3.7–5.3)
ALBUMIN/GLOB SERPL: 0.9 {RATIO} (ref 1–1.8)
ALP SERPL-CCNC: 150 U/L (ref 34–104)
ALT SERPL-CCNC: 17 U/L (ref 7–52)
ANION GAP SERPL CALC-SCNC: 13.3 MMOL/L (ref 7–16)
ANISOCYTOSIS BLD QL SMEAR: (no result)
AST SERPL-CCNC: 49 U/L (ref 13–39)
BASOPHILS NFR BLD: 3 % (ref 0–3)
BILIRUB SERPL-MCNC: 3.6 MG/DL (ref 0.3–1)
BUN SERPL-MCNC: 6 MG/DL (ref 7–25)
CALCIUM SERPL-MCNC: 8.4 MG/DL (ref 8.6–10.3)
CHLORIDE SERPL-SCNC: 107 MEQ/L (ref 98–107)
CO2 SERPL-SCNC: 18.9 MEQ/L (ref 21–31)
CREAT SERPL-MCNC: 0.5 MG/DL (ref 0.6–1.2)
EOSINOPHIL NFR BLD: 1 % (ref 0–5)
ERYTHROCYTE [DISTWIDTH] IN BLOOD BY AUTOMATED COUNT: 14.2 % (ref 11.5–20)
GLOBULIN SER-MCNC: 3.2 GM/DL
GLUCOSE SERPL-MCNC: 106 MG/DL (ref 70–105)
HCT VFR BLD CALC: 27.1 % (ref 41–60)
HGB BLD-MCNC: 8.7 GM/DL (ref 12–16)
LYMPHOCYTES # BLD MANUAL: 22 % (ref 20–50)
MACROCYTES BLD QL SMEAR: (no result)
MANUAL DIFFERENTIAL PERFORMED BLD QL: YES
MCH RBC QN AUTO: 34.5 PG (ref 27–31)
MCHC RBC AUTO-ENTMCNC: 32.2 PG (ref 28–36)
MCV RBC AUTO: 107.2 FL (ref 81–100)
MONOCYTES # BLD MANUAL: 3 % (ref 2–10)
NEUTROPHILS NFR BLD AUTO: 69 % (ref 40–80)
NEUTS BAND NFR BLD: 2 % (ref 0–10)
PLATELET # BLD EST: (no result) 10*3/UL
PLATELET # BLD: 116 TH/CMM (ref 150–400)
PMV BLD AUTO: 7 FL
POTASSIUM SERPL-SCNC: 3.2 MEQ/L (ref 3.5–5.1)
RBC # BLD AUTO: 2.52 MIL/CMM (ref 3.8–5.1)
SODIUM SERPL-SCNC: 136 MEQ/L (ref 136–145)
TOTAL CELLS COUNTED BLD: 100
WBC # BLD AUTO: 3.7 TH/CMM (ref 4.8–10.8)

## 2018-06-24 RX ADMIN — DEXTROSE AND SODIUM CHLORIDE SCH MLS/HR: 5; .9 INJECTION, SOLUTION INTRAVENOUS at 13:55

## 2018-06-24 RX ADMIN — Medication SCH TAB: at 09:10

## 2018-06-24 RX ADMIN — LACTULOSE SCH GM: 20 SOLUTION ORAL at 20:54

## 2018-06-24 RX ADMIN — LACTULOSE SCH: 20 SOLUTION ORAL at 14:55

## 2018-06-24 RX ADMIN — LACTULOSE SCH GM: 20 SOLUTION ORAL at 09:10

## 2018-06-24 NOTE — PROGRESS NOTES
DATE:  06/24/2018



IDENTIFICATION:  The patient is a 52-year-old female.  The patient seen and

examined.  The patient is lying comfortably.  The patient is seen by

gastroenterologist and recommended to have an ultrasound.  The patient is alert,

answering questions appropriately.  The patient has no active bleeding.



PHYSICAL EXAMINATION:

VITAL SIGNS:  Temperature 98.3, pulse 80, respiratory rate 18, blood pressure

115/60.

HEENT:  No facial asymmetry.  A staple wound noted.

NECK:  Supple, no JVD.

HEART:  Both heart sounds are regular.

CHEST:  Lung equal in expansion, no expiratory wheezing.

ABDOMEN:  Soft, palpable ascites noted.

EXTREMITIES:  No edema.



AVAILABLE DIAGNOSTIC DATA:  White count of 3.7, hemoglobin 8.7, platelet count

116,000.  Potassium 3.2, BUN and creatinine 6 and 0.5, total bilirubin of 3.6,

AST and ALT are 49 and _____.  Ammonia of 86.



CLINICAL IMPRESSION:

1.  Hepatic encephalopathy.

2.  Cirrhosis of liver.

3.  Hepatocellular carcinoma.

4.  Hypokalemia.

5.  Pancytopenia.

6.  Chronic alcohol abuse.

7.  Psychotic disorder.

8.  High risk for fall.

9.  Status post sutured wound for laceration of the scalp.



PLAN:  Continue Librium.  Continue IV fluid, continue to provide lactulose. 

Follow labs.  Correct electrolytes.  Follow consultant recommendation.  General

nursing care as well.  Care plan reviewed and discussed with staff.





DD: 06/24/2018 17:33

DT: 06/24/2018 19:28

JOB# [id number]  [id number]

## 2018-06-25 RX ADMIN — LACTULOSE SCH GM: 20 SOLUTION ORAL at 14:04

## 2018-06-25 RX ADMIN — DEXTROSE AND SODIUM CHLORIDE SCH MLS/HR: 5; .9 INJECTION, SOLUTION INTRAVENOUS at 14:08

## 2018-06-25 RX ADMIN — DEXTROSE AND SODIUM CHLORIDE SCH MLS/HR: 5; .9 INJECTION, SOLUTION INTRAVENOUS at 17:46

## 2018-06-25 RX ADMIN — Medication SCH TAB: at 09:01

## 2018-06-25 RX ADMIN — LACTULOSE SCH GM: 20 SOLUTION ORAL at 09:00

## 2018-06-25 RX ADMIN — LACTULOSE SCH GM: 20 SOLUTION ORAL at 20:33

## 2018-06-25 NOTE — DIAGNOSTIC IMAGING REPORT
Exam: Ultrasound examination abdomen.



HISTORY: Abnormal liver function tests.



Findings:



Real-time ultrasound examination abdomen performed multiple planes.  The

study demonstrates intrahepatic 3.8 x 7.5 x 4.4 cm heterogeneous area

and 3.1 x 4.1 x 3.6 cm which might represent hemangiomas clinical

correlation CT examination with contrast material or MRI might be

helpful



There is evidence for cholelithiasis.  The common bile duct measures 4

mm.  Pancreas normal.  The kidneys demonstrate no evidence of tracheal

uropathy or nephrolithiasis.  There is evidence of splenomegaly with

spleen measuring 19 cm in span.  No free fluid is noted.



IMPRESSION:

1.  Hepatosplenomegaly, abnormal ill-defined lesions within the liver

might represent hemangiomas contrast CT examination or MRI examination

might be helpful.

2.  Cholelithiasis.

## 2018-06-25 NOTE — PROGRESS NOTES
DATE:  06/25/2018



SUBJECTIVE:  The patient seen and examined.  The patient is lying in the bed

comfortably, alert, awake, oriented, wants to go home though the patient is

unsafe to be discharged since the patient lives with her  and  is

not available.  The patient has not walked as well.  The patient is very

unsteady.



PHYSICAL EXAMINATION:

VITAL SIGNS:  Temperature 97.1, pulse 81, respiratory 18, blood pressure 130/72.

HEENT:  Sclerae with icterus.  Tongue was pink and dry.

NECK:  Supple.  No JVD.

HEART:  Regular.

CHEST AND LUNG:  Equal in expansion, no wheezing, no crackles.

ABDOMEN:  Soft, palpable ascites noted.

EXTREMITIES:  No edema.



CLINICAL IMPRESSION:

1.  Ascites.

2.  Hepatocellular carcinoma.

3.  Hepatic encephalopathy.

4.  Pancytopenia.

5.  Alcohol dependence.

6.  Psychotic disorder.

7.  Degenerative joint disease.

8.  Status post sutured wound for laceration of the scalp.



PLAN:

1.  Continue current medication as prescribed.  PT, OT.

2.  Follow lab.

3.  Discharged to home once the patient has a well support system established.





DD: 06/25/2018 09:23

DT: 06/25/2018 23:09

JOB# [id number]  [id number]

## 2018-06-26 LAB
ANION GAP SERPL CALC-SCNC: 12.1 MMOL/L (ref 7–16)
BUN SERPL-MCNC: 4 MG/DL (ref 7–25)
CALCIUM SERPL-MCNC: 8.5 MG/DL (ref 8.6–10.3)
CHLORIDE SERPL-SCNC: 109 MEQ/L (ref 98–107)
CO2 SERPL-SCNC: 17.8 MEQ/L (ref 21–31)
CREAT SERPL-MCNC: 0.5 MG/DL (ref 0.6–1.2)
GLUCOSE SERPL-MCNC: 108 MG/DL (ref 70–105)
POTASSIUM SERPL-SCNC: 3.9 MEQ/L (ref 3.5–5.1)
SODIUM SERPL-SCNC: 135 MEQ/L (ref 136–145)

## 2018-06-26 RX ADMIN — LACTULOSE SCH GM: 20 SOLUTION ORAL at 08:35

## 2018-06-26 RX ADMIN — Medication SCH TAB: at 08:35

## 2018-06-26 NOTE — DISCHARGE SUMMARY
DATE OF DISCHARGE:  06/26/2018



PRINCIPAL DIAGNOSES:

1.  Status post fall, caused laceration of the scalp, status post suturing.

2.  Alcohol dependence and alcohol withdrawal.

3.  Seizure disorder, most likely alcohol related.

4.  Supraventricular tachycardia secondary to electrolyte imbalance.

5.  Hepatic encephalopathy.

6.  Hepatocellular carcinoma.

7.  Pancytopenia secondary to chronic liver disease and alcohol abuse.

8.  Generalized debility.

9.  Jaundice.

10.  Decline in self-care and mobility.



BRIEF STATEMENT FOR THE REASON FOR ADMISSION:  A 52-year-old  female

who looks older than her stated age, brought in to the Emergency Room by

paramedics and family member after the patient was noted to have a bleeding

scalp and noted to have a laceration, which did require emergency treatment. 

Unfortunately, the patient was noted to have SVT associated with elevated

ammonia level and a complex history of hepatocellular carcinoma with seizure

disorder.  The patient was admitted to the hospital for further treatment. 

Please refer to my H and P for further information.



HOSPITAL COURSE:  The patient was admitted to telemetry unit.  Cardiology, GI

and psych consultations were requested.  IV fluid, Librium, folic acid,

multivitamin, thiamine, were given.  Norco for pain was also provided as well. 

The patient was seen by all the disciplines and appropriate recommendations were

given as well.  The patient did have a waxing and waning mental status, which

did require a followup CT scan of the head, which did reveal the patient had no

evidence of any intracranial bleed, but scalp edema noted with mucosal

thickening and paranasal sinuses noted as well.  The patient was slowly

improving with the treatment plan.  The patient also noted to have a small

amount of ascites as well.  Gastroenterologist talked to her primary

hepatologist at [facility], Dr. Vega, and according to him,

the patient had only one round of chemotherapy, but later on, the patient

decided to drink alcohol and she lost followup.  According to his opinion, the

patient is not a candidate for any aggressive therapy considering the patient

continues to drink alcohol.  I did have a discussion with the patient in detail

about the recommendations provided by the hepatologist at [facility].  Unfortunately, the patient cannot commit herself, not to drink

alcohol.  I did have a discussion with the patient about alcohol addiction and

potential complication as well.  The patient is a good candidate for the hospice

as well.  Palliative and hospice care consults were requested.  Unfortunately,

the patient has emergency medical which does not cover hospice benefit.  The

patient's  was out of town and the patient was unable to be discharged

home safely.  Once the patient's  is back, I did talk to him over the

phone who is willing to take care of this patient at home.  The patient is

discharged home in stable condition with prescription of lactulose,

multivitamin, Protonix and thiamine and has been given.  The patient has been

strongly suggested to her primary care MD in 1 week as well.  If recurrent

symptoms, the patient should go to Emergency Room.





DD: 06/26/2018 08:58

DT: 06/26/2018 16:37

JOB# [id number]  [id number]

## 2018-06-26 NOTE — PROGRESS NOTES
DATE:  06/26/2018



PATIENT'S IDENTIFICATION:  A 52-year-old female.



SUBJECTIVE:  The patient seen and examined.  The patient is lying in the bed. 

The patient is more alert and awake.  She is anxious to go home.  I did talk to

her  who is back from [facility], and according to him, he will take care of

her at home.  The patient has a complex medical history and is extremely

debilitated.  The patient denies any chest pain, shortness of breath,

palpitation, dizziness, nausea, vomiting, headache.



PHYSICAL EXAMINATION:

VITAL SIGNS:  Temperature 99.1, pulse 82, respiratory rate 18, blood pressure

124/73.

HEENT:  Remarkable for icterus.  Tongue more pink and coated.

NECK:  Supple.  No JVD.

HEART:  Both heart sounds are regular.

CHEST AND LUNG:  Equal in expansion with mild expiratory wheezing.

ABDOMEN:  Soft.  Mild ascites noted.

EXTREMITIES:  No edema.

NEUROLOGICAL EXAM:  Nonfocal.



CLINICAL IMPRESSION:

1.  Alcohol dependence.

2.  Hepatocellular carcinoma.

3.  Cirrhosis of liver.

4.  Generalized debility.

5.  Pancytopenia.

6.  Hepatic encephalopathy.

7.  Alcohol-related seizure.

8.  History of Clostridium difficile colitis.

9.  History of supraventricular tachycardia, most likely from electrolyte

imbalance, corrected.



PLAN:  Since the patient has significantly improved.  The patient has a good

support system.  The patient will be discharged to home.  The patient has no

signs or symptoms of alcohol withdrawal.  We will not give Librium.  We will

continue folic acid, multivitamin, thiamine, along with lactulose and Protonix

as well.  The patient has been advised to see her primary care physician

followup.





DD: 06/26/2018 08:54

DT: 06/26/2018 23:09

JOB# [id number]  [id number]

## 2018-06-26 NOTE — PROGRESS NOTES
DATE:  06/25/2018



Covering for Dr. Jacobson.



Case was discussed with staff of the patient, reviewed records.  This is a

52-year-old female who was admitted on 06/19/2018 and Dr. Jacobson was consulted

because of confusion and agitation.  The patient also has hepatocellular

carcinoma.  There was a history of seizure disorder related to alcohol.  The

patient is still drinking.  Last drink was 2 days prior to admission.  The

patient was confused, hallucinating, responding to stimuli, unable to give much

information.  The patient seems to be showing a bit progress, some improvement. 

The patient is motivated to stop drinking.  She is still somewhat anxious, but

interacting appropriately.  Denied any intent to harm herself or others.  She

has been compliant with the medication.  She is less delusional.  No side

effects with the medication, no sedation, no nausea.  The patient has been on

Librium for detox, folic acid, Haldol 2 mg 3 times a day, Ativan 1 mg 3 times a

day with no side effects, no sedation, no nausea and we will continue the

patient in group therapy, milieu therapy, and adjust medications as needed.





DD: 06/25/2018 15:00

DT: 06/26/2018 03:27

JOB# [id number]  [id number]

## 2018-07-20 ENCOUNTER — HOSPITAL ENCOUNTER (INPATIENT)
Dept: HOSPITAL 36 - ER | Age: 53
LOS: 2 days | Discharge: HOME | DRG: 279 | End: 2018-07-22
Payer: MEDICAID

## 2018-07-20 DIAGNOSIS — I10: ICD-10-CM

## 2018-07-20 DIAGNOSIS — F10.229: ICD-10-CM

## 2018-07-20 DIAGNOSIS — K70.30: ICD-10-CM

## 2018-07-20 DIAGNOSIS — F10.239: ICD-10-CM

## 2018-07-20 DIAGNOSIS — K72.90: Primary | ICD-10-CM

## 2018-07-20 DIAGNOSIS — D63.8: ICD-10-CM

## 2018-07-20 DIAGNOSIS — N39.0: ICD-10-CM

## 2018-07-20 DIAGNOSIS — F17.210: ICD-10-CM

## 2018-07-20 DIAGNOSIS — K85.90: ICD-10-CM

## 2018-07-20 DIAGNOSIS — R56.9: ICD-10-CM

## 2018-07-20 DIAGNOSIS — C22.0: ICD-10-CM

## 2018-07-20 DIAGNOSIS — E78.5: ICD-10-CM

## 2018-07-20 DIAGNOSIS — K70.10: ICD-10-CM

## 2018-07-20 DIAGNOSIS — R19.7: ICD-10-CM

## 2018-07-20 DIAGNOSIS — Z82.49: ICD-10-CM

## 2018-07-20 DIAGNOSIS — E87.6: ICD-10-CM

## 2018-07-20 LAB
ALBUMIN SERPL-MCNC: 3.6 GM/DL (ref 3.7–5.3)
ALBUMIN/GLOB SERPL: 1 {RATIO} (ref 1–1.8)
ALP SERPL-CCNC: 163 U/L (ref 34–104)
ALT SERPL-CCNC: 17 U/L (ref 7–52)
AMPHET UR-MCNC: NEGATIVE NG/ML
AMYLASE SERPL-CCNC: 28 U/L (ref 29–103)
ANION GAP SERPL CALC-SCNC: 13.8 MMOL/L (ref 7–16)
APPEARANCE UR: CLEAR
AST SERPL-CCNC: 66 U/L (ref 13–39)
BACTERIA #/AREA URNS HPF: (no result) /HPF
BARBITURATES UR-MCNC: NEGATIVE UG/ML
BASOPHILS NFR BLD: 3 % (ref 0–3)
BENZODIAZEPINES PNL UR: POSITIVE
BILIRUB SERPL-MCNC: 3.2 MG/DL (ref 0.3–1)
BILIRUB UR-MCNC: NEGATIVE MG/DL
BUN SERPL-MCNC: 13 MG/DL (ref 7–25)
CALCIUM SERPL-MCNC: 9.7 MG/DL (ref 8.6–10.3)
CANNABINOIDS SERPL QL CFM: NEGATIVE
CHLORIDE SERPL-SCNC: 98 MEQ/L (ref 98–107)
CHOLEST SERPL-MCNC: 278 MG/DL (ref ?–200)
CK SERPL-CCNC: 546 U/L (ref 30–223)
CO2 SERPL-SCNC: 26.5 MEQ/L (ref 21–31)
COCAINE METAB.OTHER UR-MCNC: NEGATIVE NG/ML
COLOR UR: YELLOW
CREAT SERPL-MCNC: 0.9 MG/DL (ref 0.6–1.2)
EOSINOPHIL NFR BLD: 2 % (ref 0–5)
EPI CELLS URNS QL MICRO: (no result) /LPF
ERYTHROCYTE [DISTWIDTH] IN BLOOD BY AUTOMATED COUNT: 13.4 % (ref 11.5–20)
GLOBULIN SER-MCNC: 3.8 GM/DL
GLUCOSE SERPL-MCNC: 93 MG/DL (ref 70–105)
GLUCOSE UR STRIP-MCNC: NEGATIVE MG/DL
HCT VFR BLD CALC: 28.7 % (ref 41–60)
HDLC SERPL-MCNC: 22 MG/DL (ref 23–92)
HGB BLD-MCNC: 9.8 GM/DL (ref 12–16)
INR PPP: 1.37 (ref 0.5–1.4)
KETONES UR STRIP-MCNC: NEGATIVE MG/DL
LEUKOCYTE ESTERASE UR-ACNC: (no result)
LIPASE SERPL-CCNC: 5 U/L (ref 11–82)
LYMPHOCYTES # BLD MANUAL: 18 % (ref 20–50)
MCH RBC QN AUTO: 35 PG (ref 27–31)
MCHC RBC AUTO-ENTMCNC: 34.3 PG (ref 28–36)
MCV RBC AUTO: 102.1 FL (ref 81–100)
METHADONE UR CFM-MCNC: NEGATIVE NG/ML
METHAMPHET UR QL: NEGATIVE
MICRO URNS: YES
MONOCYTES # BLD MANUAL: 3 % (ref 2–10)
NEUTROPHILS NFR BLD AUTO: 73 % (ref 40–80)
NEUTS BAND NFR BLD: 1 % (ref 0–10)
NITRITE UR QL STRIP: NEGATIVE
OPIATES UR QL: POSITIVE
PCP UR-MCNC: NEGATIVE UG/L
PH UR STRIP: 6 [PH] (ref 4.6–8)
PLAT MORPH BLD: NORMAL
PLATELET # BLD EST: (no result) 10*3/UL
PLATELET # BLD: 120 TH/CMM (ref 150–400)
PMV BLD AUTO: 7.2 FL
POTASSIUM SERPL-SCNC: 3.3 MEQ/L (ref 3.5–5.1)
PROT UR STRIP-MCNC: NEGATIVE MG/DL
PROTHROMBIN TIME: 14.5 SECONDS (ref 9.5–11.5)
RBC # BLD AUTO: 2.81 MIL/CMM (ref 3.8–5.1)
RBC # UR STRIP: NEGATIVE /UL
RBC #/AREA URNS HPF: (no result) /HPF (ref 0–5)
RBC MORPH BLD: NORMAL
SODIUM SERPL-SCNC: 135 MEQ/L (ref 136–145)
SP GR UR STRIP: <= 1.005 (ref 1–1.03)
TRICYCLICS UR QL: NEGATIVE
TRIGL SERPL-MCNC: 212 MG/DL (ref ?–150)
URINALYSIS COMPLETE PNL UR: (no result)
UROBILINOGEN UR STRIP-ACNC: 0.2 E.U./DL (ref 0.2–1)
WBC # BLD AUTO: 7 TH/CMM (ref 4.8–10.8)
WBC #/AREA URNS HPF: (no result) /HPF (ref 0–5)

## 2018-07-20 PROCEDURE — Z7610: HCPCS

## 2018-07-20 PROCEDURE — C9113 INJ PANTOPRAZOLE SODIUM, VIA: HCPCS

## 2018-07-20 PROCEDURE — X6598: HCPCS

## 2018-07-20 NOTE — ED PHYSICIAN CHART
ED Chief Complaint/HPI





- Patient Information


Date Seen:: 07/20/18


Time Seen:: 14:00


Chief Complaint:: ALOC


History of Present Illness:: 





onset x 3 hours of ALOC, AMS, and decreased activity; Hx of ETOH abuse; no 

report of trauma, H/As, S/T, neck pain, cough, C/P, SOB, Abd. Pain, A/N/V/D/C, 

fever, chills, or urinary s/s; pt's last tetanus shot: > 5 years


Allergies:: 


 Allergies











Allergy/AdvReac Type Severity Reaction Status Date / Time


 


No Known Allergies Allergy   Verified 07/20/18 14:04











Vitals:: 


 Vital Signs - 8 hr











  07/20/18





  14:05


 


Temp 98.3 F


 


HR 89


 


RR 21


 


BP 97/45


 


O2 Sat % 94











Historian:: Patient, EMS


Review:: Nurse's Note Reviewed, Old Chart Reviewed, EMS run form Reviewed





<Trevor Apodaca - Last Filed: 07/20/18 19:10>





- Patient Information


Allergies:: 


 Allergies











Allergy/AdvReac Type Severity Reaction Status Date / Time


 


No Known Allergies Allergy   Verified 07/20/18 14:04











Vitals:: 


 Vital Signs - 8 hr











  07/20/18 07/20/18 07/20/18





  14:00 14:05 16:30


 


Temp 97 F 98.3 F 97.2 F


 


HR 85 89 91


 


RR 12 21 14


 


BP 97/45 97/45 103/59


 


O2 Sat % 96 94 96














  07/20/18 07/20/18





  18:00 19:55


 


Temp 97.9 F 98.6 F


 


HR 98 102


 


RR 14 18


 


/53 104/53


 


O2 Sat % 94 93














<Sonya Marsh - Last Filed: 07/20/18 21:03>





ED Review of Systems





- Review of Systems


General/Constitutional: No fever, No chills, No weight loss, No weakness, No 

diaphoresis, No edema, No loss of appetite


Skin: No skin lesions, No rash, No bruising


Head: No headache, No light-headedness


Eyes: No loss of vision, No pain, No diplopia


ENT: No earache, No nasal drainage, No sore throat, No tinnitus


Neck: No neck pain, No swelling, No thyromegaly, No stiffness, No mass noted


Cardio Vascular: No chest pain, No palpitations, No PND, No orthopnea, No edema


Pulmonary: No SOB, No cough, No sputum, No wheezing


GI: No nausea, No vomiting, No diarrhea, No pain, No melena, No hematochezia, 

No constipation, No hematemesis


G/U: No dysuria, No frequency, No hematuria, No nacturia


Ob/Gyn: No vaginal discharge, No abnormal vaginal bleed, No contraction


Musculoskeletal: No bone or joint pain, No back pain, No muscle pain


Endocrine: No polyuria, No polydipsia


Psychiatric: No prior psych history, No depression, No anxiety, No suicidal 

ideation, No homicidal ideation, No auditory hallucination, No visual 

hallucination


Hematopoietic: No bruising, No lymphadenopathy


Allergic/Immuno: No urticaria, No angioedema


Neurological: No syncope, No focal symptoms, No weakness, No paresthesia, No 

headache, No seizure, No dizziness, No confusion, No vertigo





<YuechucklindsayTrevor - Last Filed: 07/20/18 19:10>





ED Past Medical History





- Past Medical History


Obtainable: Yes


Past Medical History: HTN, Dyslipidemia, Seizures, Other (Cirrhosis)


Family History: HTN


Social History: Smoker, Alcohol, No Drug Use, Single


Surgical History: None


Psychiatricy History: None


Medication: Reviewed





<YuejocelyneTrevor - Last Filed: 07/20/18 19:10>





Family Medical History





- Family Member


  ** Mother


History Unknown: Yes





<ConstanzaTrevor - Last Filed: 07/20/18 19:10>





ED Physical Exam





- Physical Examination


General/Constitutional: Awake, Well-developed, well-nourished, Alert, No 

distress, GCS 15, Non-toxic appearing, Ambulatory


Head: Atraumatic


Other Head comments:: 





+ Middle Frontal Scalp Abrasions; no cellulitis; no FBs; good motor and sensory 

functions; good NV functions


Eyes: Lids, conjuctiva normal, PERRL, EOMI


Skin: Nl inspection, No rash, No skin lesions, No ecchymosis, Well hydrated, No 

lymphadenopathy


ENMT: External ears, nose nl, TM canals nl, Nasal exam nl, Lips, teeth, gums nl

, Oropharynx nl, Tonsils nl


Neck: Nontender, Full ROM w/o pain, No JVD, No nuchal rigidity, No bruit, No 

mass, No stridor


Respiratory: Nl effort/Exclusion, Clear to Auscultation, No Wheeze/Rhonchi/Rales


Cardio Vascular: RRR, No murmur, gallop, rubs, NL S1 S2, Carotid/Femoral/Distal 

pulses equal bilaterally


GI: No tenderness/rebounding/guarding, No organomegaly, No hernia, Normal BS's, 

Nondistended, No mass/bruits, No McBurney tenderness, Rectum exam nl


: No CVA tenderness


Extremities: No tenderness or effusion, Full ROM, normal strength in all 

extremities, No edema, Normal digits & nails


Neuro/Psych: Alert/oriented, DTR's symmetric, Normal sensory exam, Normal motor 

strength, Judgement/insight normal, Mood normal, Normal gait, No focal deficits


Misc: Normal back, No paraspinal tenderness





<Trevor Apodaca - Last Filed: 07/20/18 19:10>





- Physical Examination


Eyes: PERRL, EOMI


Other Eyes comments:: 


Eyes are icteric.


Other ENMT comments:: 


dried blood on left side of mouth.  No blood in oropharynx.  No acute nasal 

swelling.


Respiratory: Nl effort/Exclusion, Clear to Auscultation, No Wheeze/Rhonchi/Rales


Cardio Vascular: RRR, No murmur, gallop, rubs, NL S1 S2


Other Extremities comments:: 


patient has bilateral foot edema with some erythema.  The swelling is more 

present on the L foot than on the R foot.  There is no evidence of a deep 

venous thrombosis.  20:56 p.m.





<Sonya Marsh - Last Filed: 07/20/18 21:03>





ED Labs/Radiology/EKG Results





- Lab Results


Comments:: 





ETOH: 124; + Anemia; Elevated LFTs; K+: 3.3





- EKG Interpretations


EKG Time:: 14:53


Rate & Rhythm: 89; NSR


Comments:: 





IVCD; non-specific st-t changes





<Trevor Apodaca - Last Filed: 07/20/18 19:10>





- Lab Results


Results: 


 Laboratory Tests











  07/20/18 07/20/18 07/20/18





  14:30 14:30 14:30


 


WBC   7.0 


 


RBC   2.81 L 


 


Hgb   9.8 L 


 


Hct   28.7 L 


 


MCV   102.1 H 


 


MCH   35.0 H 


 


MCHC Differential   34.3 


 


RDW   13.4 


 


Plt Count   120 L 


 


MPV   7.2 


 


Add Manual Diff   YES 


 


Band Neutrophils %   1 


 


Neutrophils (Manual)   73 


 


Lymphocytes   18 L 


 


Monocytes   3 


 


Eosinophils   2 


 


Basophils   3 


 


Platelet Estimate   SLIGHT DECREASED 


 


Platelet Morphology   NORMAL 


 


RBC Morph Micro Appear   NORMAL 


 


PT    14.5 H


 


INR    1.37


 


Sodium  135 L  


 


Potassium  3.3 L  


 


Chloride  98  


 


Carbon Dioxide  26.5  


 


Anion Gap  13.8  


 


BUN  13  


 


Creatinine  0.9  


 


Est GFR ( Amer)  > 60.0  


 


Est GFR (Non-Af Amer)  > 60.0  


 


BUN/Creatinine Ratio  14.4  


 


Glucose  93  


 


Calcium  9.7  


 


Total Bilirubin  3.2 H  


 


AST  66 H  


 


ALT  17  


 


Alkaline Phosphatase  163 H  


 


Creatine Kinase  546 H  


 


CK-MB (CK-2)  12.3 H  


 


Troponin I   


 


B-Natriuretic Peptide   


 


Total Protein  7.4  


 


Albumin  3.6 L  


 


Globulin  3.8  


 


Albumin/Globulin Ratio  1.0  


 


Triglycerides  212 H  


 


Cholesterol  278 H  


 


LDL Cholesterol Direct  207 H  


 


HDL Cholesterol  22 L  


 


Amylase  28 L  


 


Lipase  5 L  


 


Serum Pregnancy, Qual   


 


Urine Source   


 


Urine Color   


 


Urine Clarity   


 


Urine pH   


 


Ur Specific Gravity   


 


Urine Protein   


 


Urine Glucose (UA)   


 


Urine Ketones   


 


Urine Blood   


 


Urine Nitrate   


 


Urine Bilirubin   


 


Urine Urobilinogen   


 


Ur Leukocyte Esterase   


 


Urine RBC   


 


Urine WBC   


 


Ur Epithelial Cells   


 


Urine Bacteria   


 


Urine Mucus   


 


Urine Pregnancy Test   


 


Urine Opiates Screen   


 


Urine Methadone Screen   


 


Ur Barbiturates Screen   


 


Ur Tricyclics Screen   


 


Ur Phencyclidine Scrn   


 


Amphetamines Screen   


 


U Methamphetamines Scrn   


 


U Benzodiazepines Scrn   


 


U Cocaine Metab Screen   


 


U Cannabinoids Screen   


 


Ethyl Alcohol  124 H  














  07/20/18 07/20/18 07/20/18





  14:30 14:30 14:30


 


WBC   


 


RBC   


 


Hgb   


 


Hct   


 


MCV   


 


MCH   


 


MCHC Differential   


 


RDW   


 


Plt Count   


 


MPV   


 


Add Manual Diff   


 


Band Neutrophils %   


 


Neutrophils (Manual)   


 


Lymphocytes   


 


Monocytes   


 


Eosinophils   


 


Basophils   


 


Platelet Estimate   


 


Platelet Morphology   


 


RBC Morph Micro Appear   


 


PT   


 


INR   


 


Sodium   


 


Potassium   


 


Chloride   


 


Carbon Dioxide   


 


Anion Gap   


 


BUN   


 


Creatinine   


 


Est GFR ( Amer)   


 


Est GFR (Non-Af Amer)   


 


BUN/Creatinine Ratio   


 


Glucose   


 


Calcium   


 


Total Bilirubin   


 


AST   


 


ALT   


 


Alkaline Phosphatase   


 


Creatine Kinase   


 


CK-MB (CK-2)   


 


Troponin I   0.04 


 


B-Natriuretic Peptide  219.0 H  


 


Total Protein   


 


Albumin   


 


Globulin   


 


Albumin/Globulin Ratio   


 


Triglycerides   


 


Cholesterol   


 


LDL Cholesterol Direct   


 


HDL Cholesterol   


 


Amylase   


 


Lipase   


 


Serum Pregnancy, Qual    NEGATIVE


 


Urine Source   


 


Urine Color   


 


Urine Clarity   


 


Urine pH   


 


Ur Specific Gravity   


 


Urine Protein   


 


Urine Glucose (UA)   


 


Urine Ketones   


 


Urine Blood   


 


Urine Nitrate   


 


Urine Bilirubin   


 


Urine Urobilinogen   


 


Ur Leukocyte Esterase   


 


Urine RBC   


 


Urine WBC   


 


Ur Epithelial Cells   


 


Urine Bacteria   


 


Urine Mucus   


 


Urine Pregnancy Test   


 


Urine Opiates Screen   


 


Urine Methadone Screen   


 


Ur Barbiturates Screen   


 


Ur Tricyclics Screen   


 


Ur Phencyclidine Scrn   


 


Amphetamines Screen   


 


U Methamphetamines Scrn   


 


U Benzodiazepines Scrn   


 


U Cocaine Metab Screen   


 


U Cannabinoids Screen   


 


Ethyl Alcohol   














  07/20/18 07/20/18 07/20/18





  16:53 16:53 16:53


 


WBC   


 


RBC   


 


Hgb   


 


Hct   


 


MCV   


 


MCH   


 


MCHC Differential   


 


RDW   


 


Plt Count   


 


MPV   


 


Add Manual Diff   


 


Band Neutrophils %   


 


Neutrophils (Manual)   


 


Lymphocytes   


 


Monocytes   


 


Eosinophils   


 


Basophils   


 


Platelet Estimate   


 


Platelet Morphology   


 


RBC Morph Micro Appear   


 


PT   


 


INR   


 


Sodium   


 


Potassium   


 


Chloride   


 


Carbon Dioxide   


 


Anion Gap   


 


BUN   


 


Creatinine   


 


Est GFR ( Amer)   


 


Est GFR (Non-Af Amer)   


 


BUN/Creatinine Ratio   


 


Glucose   


 


Calcium   


 


Total Bilirubin   


 


AST   


 


ALT   


 


Alkaline Phosphatase   


 


Creatine Kinase   


 


CK-MB (CK-2)   


 


Troponin I   


 


B-Natriuretic Peptide   


 


Total Protein   


 


Albumin   


 


Globulin   


 


Albumin/Globulin Ratio   


 


Triglycerides   


 


Cholesterol   


 


LDL Cholesterol Direct   


 


HDL Cholesterol   


 


Amylase   


 


Lipase   


 


Serum Pregnancy, Qual   


 


Urine Source  CLEAN C  


 


Urine Color  YELLOW  


 


Urine Clarity  CLEAR  


 


Urine pH  6.0  


 


Ur Specific Gravity  <= 1.005  


 


Urine Protein  NEGATIVE  


 


Urine Glucose (UA)  NEGATIVE  


 


Urine Ketones  NEGATIVE  


 


Urine Blood  NEGATIVE  


 


Urine Nitrate  NEGATIVE  


 


Urine Bilirubin  NEGATIVE  


 


Urine Urobilinogen  0.2  


 


Ur Leukocyte Esterase  MODERATE H  


 


Urine RBC  NONE SEEN  


 


Urine WBC  2-5  


 


Ur Epithelial Cells  FEW  


 


Urine Bacteria  NONE SEEN  


 


Urine Mucus  FEW  


 


Urine Pregnancy Test   NEGATIVE 


 


Urine Opiates Screen    POSITIVE H


 


Urine Methadone Screen    NEGATIVE


 


Ur Barbiturates Screen    NEGATIVE


 


Ur Tricyclics Screen    NEGATIVE


 


Ur Phencyclidine Scrn    NEGATIVE


 


Amphetamines Screen    NEGATIVE


 


U Methamphetamines Scrn    NEGATIVE


 


U Benzodiazepines Scrn    POSITIVE H


 


U Cocaine Metab Screen    NEGATIVE


 


U Cannabinoids Screen    NEGATIVE


 


Ethyl Alcohol   














<Sonya Marsh - Last Filed: 07/20/18 21:03>





ED Assessment





- Assessment


General Assessment: 


I took over patient care at 7:10 p.m.  Note from Dr. Apodaca needs to be 

documented before I took over this patient's care.  20:59 p.m.


Assessment/Comments:: 


spoke to Dr. Atkinson at 20:53 p.m. about admitting this patient with alcohol 

intoxication, UTI and alcohol withdrawal with tremors and positive for opiates 

and benzos on her urine drug screen.  He will admit the patient.  ASTRID Hanson





<Sonya Marsh - Last Filed: 07/20/18 21:03>





ED Septic Shock





- .


Is Septic Shock (SBP<90, OR Lactate>4 mmol\L) present?: No





- <6hrs of presentation:


Vital Signs: 


 Vital Signs - 8 hr











  07/20/18





  14:05


 


Temp 98.3 F


 


HR 89


 


RR 21


 


BP 97/45


 


O2 Sat % 94














<Trevor Apodaca - Last Filed: 07/20/18 19:10>





- .


Is Septic Shock (SBP<90, OR Lactate>4 mmol\L) present?: No





- <6hrs of presentation:


Vital Signs: 


 Vital Signs - 8 hr











  07/20/18 07/20/18 07/20/18





  14:00 14:05 16:30


 


Temp 97 F 98.3 F 97.2 F


 


HR 85 89 91


 


RR 12 21 14


 


BP 97/45 97/45 103/59


 


O2 Sat % 96 94 96














  07/20/18 07/20/18





  18:00 19:55


 


Temp 97.9 F 98.6 F


 


HR 98 102


 


RR 14 18


 


/53 104/53


 


O2 Sat % 94 93














<Sonya Marsh - Last Filed: 07/20/18 21:03>





ED Reassessment (Disposition)





- Reassessment


Reassessment Condition:: Improved





- Diagnosis


Diagnosis:: 





ALOC; AMS; ETOH Intoxication; Alcohol Abuse; Alcohol Intoxication; Hypokalemia; 

Anemia; Elevated LFTs; Cirrhosis





<Trevor Apodaca - Last Filed: 07/20/18 19:10>

## 2018-07-21 VITALS — SYSTOLIC BLOOD PRESSURE: 118 MMHG | DIASTOLIC BLOOD PRESSURE: 69 MMHG

## 2018-07-21 RX ADMIN — DEXTROSE AND SODIUM CHLORIDE SCH MLS/HR: 5; .45 INJECTION, SOLUTION INTRAVENOUS at 05:07

## 2018-07-21 RX ADMIN — DEXTROSE AND SODIUM CHLORIDE SCH MLS/HR: 5; .45 INJECTION, SOLUTION INTRAVENOUS at 22:04

## 2018-07-21 RX ADMIN — METRONIDAZOLE SCH MLS/HR: 500 INJECTION, SOLUTION INTRAVENOUS at 21:38

## 2018-07-21 NOTE — DIAGNOSTIC IMAGING REPORT
Exam: CT examination of brain.



HISTORY: Trauma.



Total DLP equals 832



CTDI equals 38.6



Multiple contiguous thin section of brain were obtained from base of

skull to the vertex without the administration of contrast material.  No

prior studies available for comparison.



The study demonstrates no evidence of  hemorrhage or midline shift. 

There is no evidence for edema.  The brain parenchyma is intact.  Bony

calvarium is normal.  Old nasal fracture appreciated.



IMPRESSION: Essentially unremarkable examination of brain.

.

## 2018-07-21 NOTE — GI PROGRESS NOTE
Subjective





- Review of Systems


Service Date: 07/21/18


Subjective: 


PT HAS ANOTHER EMR RECORD WITH BIRTHDAY [birth year]. 





Pt well known to our service. She has long history of alcoholism and known 

history of hepatocellular carcinoma. She is now readmitted with intoxication 

and vomiting.





Objective





- Results


Result Diagrams: 


 07/20/18 14:30





 07/20/18 14:30


Recent Labs: 


 Laboratory Last Values











WBC  7.0 Th/cmm (4.8-10.8)   07/20/18  14:30    


 


RBC  2.81 Mil/cmm (3.80-5.10)  L  07/20/18  14:30    


 


Hgb  9.8 gm/dL (12-16)  L  07/20/18  14:30    


 


Hct  28.7 % (41.0-60)  L  07/20/18  14:30    


 


MCV  102.1 fl ()  H  07/20/18  14:30    


 


MCH  35.0 pg (27.0-31.0)  H  07/20/18  14:30    


 


MCHC Differential  34.3 pg (28.0-36.0)   07/20/18  14:30    


 


RDW  13.4 % (11.5-20.0)   07/20/18  14:30    


 


Plt Count  120 Th/cmm (150-400)  L  07/20/18  14:30    


 


MPV  7.2 fl  07/20/18  14:30    


 


Add Manual Diff  YES   07/20/18  14:30    


 


Band Neutrophils %  1 % (0-10)   07/20/18  14:30    


 


Neutrophils (Manual)  73 % (40-80)   07/20/18  14:30    


 


Lymphocytes  18 % (20-50)  L  07/20/18  14:30    


 


Monocytes  3 % (2-10)   07/20/18  14:30    


 


Eosinophils  2 % (0-5)   07/20/18  14:30    


 


Basophils  3 % (0-3)   07/20/18  14:30    


 


Platelet Estimate  SLIGHT DECREASED  (NORMAL)   07/20/18  14:30    


 


Platelet Morphology  NORMAL  (NORMAL)   07/20/18  14:30    


 


RBC Morph Micro Appear  NORMAL  (NORMAL)   07/20/18  14:30    


 


PT  14.5 SECONDS (9.5-11.5)  H  07/20/18  14:30    


 


INR  1.37  (0.5-1.4)   07/20/18  14:30    


 


Sodium  135 mEq/L (136-145)  L  07/20/18  14:30    


 


Potassium  3.3 mEq/L (3.5-5.1)  L  07/20/18  14:30    


 


Chloride  98 mEq/L ()   07/20/18  14:30    


 


Carbon Dioxide  26.5 mEq/L (21.0-31.0)   07/20/18  14:30    


 


Anion Gap  13.8  (7.0-16.0)   07/20/18  14:30    


 


BUN  13 mg/dL (7-25)   07/20/18  14:30    


 


Creatinine  0.9 mg/dL (0.6-1.2)   07/20/18  14:30    


 


Est GFR ( Amer)  > 60.0 ml/min (>90)   07/20/18  14:30    


 


Est GFR (Non-Af Amer)  > 60.0 ml/min  07/20/18  14:30    


 


BUN/Creatinine Ratio  14.4   07/20/18  14:30    


 


Glucose  93 mg/dL ()   07/20/18  14:30    


 


Calcium  9.7 mg/dL (8.6-10.3)   07/20/18  14:30    


 


Total Bilirubin  3.2 mg/dL (0.3-1.0)  H  07/20/18  14:30    


 


AST  66 U/L (13-39)  H  07/20/18  14:30    


 


ALT  17 U/L (7-52)   07/20/18  14:30    


 


Alkaline Phosphatase  163 U/L ()  H  07/20/18  14:30    


 


Creatine Kinase  546 U/L ()  H  07/20/18  14:30    


 


CK-MB (CK-2)  12.3 ng/mL (0.6-6.3)  H  07/20/18  14:30    


 


Troponin I  0.04 ng/mL (0.01-0.05)   07/20/18  14:30    


 


B-Natriuretic Peptide  219.0 pg/mL (5.0-100.0)  H  07/20/18  14:30    


 


Total Protein  7.4 gm/dL (6.0-8.3)   07/20/18  14:30    


 


Albumin  3.6 gm/dL (3.7-5.3)  L  07/20/18  14:30    


 


Globulin  3.8 gm/dL  07/20/18  14:30    


 


Albumin/Globulin Ratio  1.0  (1.0-1.8)   07/20/18  14:30    


 


Triglycerides  212 mg/dL (<150)  H  07/20/18  14:30    


 


Cholesterol  278 mg/dL (<200)  H  07/20/18  14:30    


 


LDL Cholesterol Direct  207 mg/dL ()  H  07/20/18  14:30    


 


HDL Cholesterol  22 mg/dL (23-92)  L  07/20/18  14:30    


 


Amylase  28 U/L ()  L  07/20/18  14:30    


 


Lipase  5 U/L (11-82)  L  07/20/18  14:30    


 


Serum Pregnancy, Qual  NEGATIVE  (NEGATIVE)   07/20/18  14:30    


 


Urine Source  CLEAN C   07/20/18  16:53    


 


Urine Color  YELLOW   07/20/18  16:53    


 


Urine Clarity  CLEAR  (CLEAR)   07/20/18  16:53    


 


Urine pH  6.0  (4.6 - 8.0)   07/20/18  16:53    


 


Ur Specific Gravity  <= 1.005  (1.005-1.030)   07/20/18  16:53    


 


Urine Protein  NEGATIVE mg/dL (NEGATIVE)   07/20/18  16:53    


 


Urine Glucose (UA)  NEGATIVE mg/dL (NEGATIVE)   07/20/18  16:53    


 


Urine Ketones  NEGATIVE mg/dL (NEGATIVE)   07/20/18  16:53    


 


Urine Blood  NEGATIVE  (NEGATIVE)   07/20/18  16:53    


 


Urine Nitrate  NEGATIVE  (NEGATIVE)   07/20/18  16:53    


 


Urine Bilirubin  NEGATIVE  (NEGATIVE)   07/20/18  16:53    


 


Urine Urobilinogen  0.2 E.U./dL (0.2 - 1.0)   07/20/18  16:53    


 


Ur Leukocyte Esterase  MODERATE  (NEGATIVE)  H  07/20/18  16:53    


 


Urine RBC  NONE SEEN /hpf (0-5)   07/20/18  16:53    


 


Urine WBC  2-5 /hpf (0-5)   07/20/18  16:53    


 


Ur Epithelial Cells  FEW /lpf (FEW)   07/20/18  16:53    


 


Urine Bacteria  NONE SEEN /hpf (NONE SEEN)   07/20/18  16:53    


 


Urine Mucus  FEW /lpf (FEW)   07/20/18  16:53    


 


Urine Pregnancy Test  NEGATIVE   07/20/18  16:53    


 


Urine Opiates Screen  POSITIVE  (NEGATIVE)  H  07/20/18  16:53    


 


Urine Methadone Screen  NEGATIVE  (NEGATIVE)   07/20/18  16:53    


 


Ur Barbiturates Screen  NEGATIVE  (NEGATIVE)   07/20/18  16:53    


 


Ur Tricyclics Screen  NEGATIVE  (NEGATIVE)   07/20/18  16:53    


 


Ur Phencyclidine Scrn  NEGATIVE  (NEGATIVE)   07/20/18  16:53    


 


Amphetamines Screen  NEGATIVE  (NEGATIVE)   07/20/18  16:53    


 


U Methamphetamines Scrn  NEGATIVE  (NEGATIVE)   07/20/18  16:53    


 


U Benzodiazepines Scrn  POSITIVE  (NEGATIVE)  H  07/20/18  16:53    


 


U Cocaine Metab Screen  NEGATIVE  (NEGATIVE)   07/20/18  16:53    


 


U Cannabinoids Screen  NEGATIVE  (NEGATIVE)   07/20/18  16:53    


 


Ethyl Alcohol  124 mg/dL (0-10)  H  07/20/18  14:30    














- Physical Exam


Vitals and I&O: 


 Vital Signs











Temp  98.5 F   07/21/18 07:41


 


Pulse  81   07/21/18 07:41


 


Resp  19   07/21/18 07:41


 


BP  113/65   07/21/18 07:41


 


Pulse Ox  99   07/21/18 06:16








 Intake & Output











 07/20/18 07/21/18 07/21/18





 18:59 06:59 18:59


 


Output Total 400  


 


Balance -400  


 


Weight (lbs) 53.524 kg  57.606 kg


 


Output:   


 


  Urine 400  


 


Other:   


 


  # Voids   3


 


  Weight Source Estimated  Bedscale











Active Medications: 


Current Medications





Chlordiazepoxide (Librium)  50 mg PO Q6HR PRN; Protocol


   PRN Reason: Agitation


   Stop: 09/19/18 04:42


Dextrose/Sodium Chloride (D5-0.45ns)  1,000 mls @ 80 mls/hr IV .[id number] RADHA


   Stop: 09/18/18 22:59


   Last Admin: 07/21/18 05:07 Dose:  80 mls/hr


Ceftriaxone Sodium 1 gm/ (Dextrose)  50 mls @ 100 mls/hr IV Q24HR RADHA


   Stop: 09/19/18 04:42


Ketorolac Tromethamine (Toradol)  15 mg IVP Q6HR PRN


   PRN Reason: Pain (Mild) LEVEL 1-3


   Stop: 09/19/18 04:42


   Last Admin: 07/21/18 05:17 Dose:  15 mg


Lorazepam (Ativan)  1 mg IVP Q4HR PRN; Protocol


   PRN Reason: Alcohol Withdrawal


   Stop: 09/18/18 20:24


   Last Admin: 07/20/18 20:58 Dose:  1 mg


Ondansetron HCl (Zofran)  4 mg IV Q6HR PRN


   PRN Reason: Nausea


   Stop: 09/19/18 04:42


Pantoprazole Sodium (Protonix)  40 mg IVP DAILY RADHA


   Stop: 09/19/18 08:59


   Last Admin: 07/21/18 08:34 Dose:  40 mg








General: Alert, Oriented x3


Cardiovascular: Regular rate


Abdomen: Bowel sounds, Soft, Hepatomegaly, no Tender, no Rebound, no Guarding


Extremities: no Clubbing


Psych/Mental Status: Mental status NL





Assessment/Plan





- Problem List


Patient Problems: 


All Active Problems





ALCOHOL INGESTION, INABILITY TO AMBULATE (Acute) 











- Assessment


Assessment: 


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


# Alcoholsim


# Hepatocellular carcinoma








Pt with HCC, followed by Dr Vega. She has had TACE previously, but this had 

been on hold as the pt has begun to drink again over the past 1-2 months. She 

was admitted ion 6/2018 for similar issues.





She has alcoholic hepatitis, and is still drinking. She has had EGD within the 

last year.





Plan:





- needs to stop EtOH in order to have more treatments for HCC. Follow with Dr Vega (her hepatologist)


- watch for EtOH withdrawal, she has had seizures before from this


- trend LFTs and CBC


- US performed 1 mo ago, does not need repeating now


- start diet, and advance as tolerated. Ultimately needs low salt


- no need to EGD now unless there is over bleeding

## 2018-07-21 NOTE — HISTORY & PHYSICAL
ADMIT DATE:  07/21/2018



CHIEF COMPLAINT:  Altered mental status, unable to ambulate.



HISTORY OF PRESENT ILLNESS:  This is a 52-year-old female who was evaluated in

the Emergency Room for altered mental status, unable to ambulate, diagnosed with

alcohol intoxication, UTI and subsequently admitted to the hospital for

treatment.  The patient has underlying history of chronic liver cirrhosis,

alcohol related, also hepatocellular carcinoma per GI report, and alcoholic

hepatitis.  Per GI report, the patient is being followed by Dr. Vega in

[place].  Treatment was stopped because the patient restarted drinking.  The

patient stated that she was recently hospitalized at [facility],

diagnosed with UTI and C. diff colitis, was discharged home with antibiotic, but

she is in the process of moving, so she was unable to fill the prescription. 

The patient reported about 3-4 loose stools since morning with some foul odor. 

The patient denies any vomiting.  No abdominal pain.  No nausea, no fever, no

chills.



PAST MEDICAL HISTORY:  Alcoholic liver cirrhosis, alcoholic hepatitis,

hepatocellular carcinoma.



PAST SURGICAL HISTORY:  No significant past surgical history reported.



SOCIAL HISTORY:  Positive for alcohol use.



CURRENT MEDICATIONS:  Per medical reconciliation and reviewed.



ALLERGIES:  No known drug allergies.



REVIEW OF SYSTEMS:  As per HPI.  A 12-point system review appears negative.



PHYSICAL EXAMINATION:

VITAL SIGNS:  Temperature 98.2, pulse 85, respirations 18, blood pressure

128/71, 98% through nasal cannula.

HEART:  S1, S2 normal.

LUNGS:  Clear to auscultation.

ABDOMEN:  Soft, nontender, nondistended.

EXTREMITIES:  Mild edema noted.



LABORATORY DATA:  Available lab data has been reviewed.



ASSESSMENT:

1.  Alcohol intoxication.

2.  Diarrhea, rule out Clostridium difficile.

3.  Urinary tract infection.

4.  Alcoholic hepatitis.

5.  Hepatocellular carcinoma.

6.  Hypokalemia.

7.  Anemia of chronic disease.

8.  Hyperlipidemia.



PLAN:  The patient was admitted to tele unit, was on clear liquids, seen by GI. 

Diet was advanced to full liquid.  IV fluid has been given.  IV Protonix has

been given.  Empiric Flagyl started.  IV Rocephin started.  ID consulted for

further evaluation and recommendations.  I will encourage for alcohol avoidance.

 We will check the stool sample for C. diff confirmations.  Librium for

agitation and anxiety, Tramadol as needed for pain, Zofran, Protonix is on

board.  The patient's condition and plan of care discussed with nursing staff. 

Appreciate GI input.  We will follow up on the GI recommendations.





DD: 07/21/2018 18:04

DT: 07/21/2018 19:03

JOB# [id number]  [id number]

## 2018-07-21 NOTE — DIAGNOSTIC IMAGING REPORT
Exam: CT examination cervical spine.



HISTORY: Trauma.



Total DLP equals 386



CTDI equals 17. 2



Findings: Multiple contiguous thin section of the cervical spine were

obtained in axial plane with coronal and sagittal reconstruction

technique.  No prior studies available comparison.



The study demonstrates reversal of the cervical lordosis.  The vertebral

bodies of normal height with narrowing of the intravertebral disc spaces

at C3 C4 C5 intervertebral disc spaces.  There is evidence of for a

moderate C4-C5 broad-based central disc protrusion.  Mild spinal

stenosis appreciated this level MRI examination might be helpful.  No

prevertebral soft tissue swelling is noted.  Vascular calcifications

identified.



IMPRESSION:



Degenerative changes, multilevel cervical disc disease and facet joint

arthropathy.



C4-C5 prominent central base disc protrusion resulting in moderate

spinal stenosis.



MRI examination might be helpful.

## 2018-07-22 LAB
ALBUMIN SERPL-MCNC: 2.6 GM/DL (ref 3.7–5.3)
ALBUMIN/GLOB SERPL: 0.7 {RATIO} (ref 1–1.8)
ALP SERPL-CCNC: 123 U/L (ref 34–104)
ALT SERPL-CCNC: 12 U/L (ref 7–52)
ANION GAP SERPL CALC-SCNC: 9.5 MMOL/L (ref 7–16)
AST SERPL-CCNC: 50 U/L (ref 13–39)
BASOPHILS NFR BLD: 0 % (ref 0–3)
BILIRUB SERPL-MCNC: 2.8 MG/DL (ref 0.3–1)
BUN SERPL-MCNC: 6 MG/DL (ref 7–25)
CALCIUM SERPL-MCNC: 8.5 MG/DL (ref 8.6–10.3)
CHLORIDE SERPL-SCNC: 102 MEQ/L (ref 98–107)
CO2 SERPL-SCNC: 28.8 MEQ/L (ref 21–31)
CREAT SERPL-MCNC: 0.6 MG/DL (ref 0.6–1.2)
EOSINOPHIL NFR BLD: 0 % (ref 0–5)
ERYTHROCYTE [DISTWIDTH] IN BLOOD BY AUTOMATED COUNT: 13.5 % (ref 11.5–20)
GLOBULIN SER-MCNC: 3.5 GM/DL
GLUCOSE SERPL-MCNC: 100 MG/DL (ref 70–105)
HCT VFR BLD CALC: 25.7 % (ref 41–60)
HGB BLD-MCNC: 8.5 GM/DL (ref 12–16)
LYMPHOCYTES # BLD MANUAL: 22 % (ref 20–50)
MCH RBC QN AUTO: 33.4 PG (ref 27–31)
MCHC RBC AUTO-ENTMCNC: 33 PG (ref 28–36)
MCV RBC AUTO: 101.3 FL (ref 81–100)
MONOCYTES # BLD MANUAL: 4 % (ref 2–10)
NEUTROPHILS NFR BLD AUTO: 74 % (ref 40–80)
NEUTS BAND NFR BLD: 0 % (ref 0–10)
PLAT MORPH BLD: NORMAL
PLATELET # BLD EST: ADEQUATE 10*3/UL
PLATELET # BLD: 102 TH/CMM (ref 150–400)
PMV BLD AUTO: 7.2 FL
POTASSIUM SERPL-SCNC: 3.3 MEQ/L (ref 3.5–5.1)
RBC # BLD AUTO: 2.54 MIL/CMM (ref 3.8–5.1)
RBC MORPH BLD: NORMAL
SODIUM SERPL-SCNC: 137 MEQ/L (ref 136–145)
WBC # BLD AUTO: 4.1 TH/CMM (ref 4.8–10.8)

## 2018-07-22 RX ADMIN — METRONIDAZOLE SCH MLS/HR: 500 INJECTION, SOLUTION INTRAVENOUS at 05:01

## 2018-07-22 NOTE — GI PROGRESS NOTE
Subjective





- Review of Systems


Service Date: 07/22/18


Subjective: 


Tolerating full liquids





Objective





- Results


Result Diagrams: 


 07/22/18 04:32





 07/22/18 04:32


Recent Labs: 


 Laboratory Last Values











WBC  4.1 Th/cmm (4.8-10.8)  L  07/22/18  04:32    


 


RBC  2.54 Mil/cmm (3.80-5.10)  L  07/22/18  04:32    


 


Hgb  8.5 gm/dL (12-16)  L  07/22/18  04:32    


 


Hct  25.7 % (41.0-60)  L  07/22/18  04:32    


 


MCV  101.3 fl ()  H  07/22/18  04:32    


 


MCH  33.4 pg (27.0-31.0)  H  07/22/18  04:32    


 


MCHC Differential  33.0 pg (28.0-36.0)   07/22/18  04:32    


 


RDW  13.5 % (11.5-20.0)   07/22/18  04:32    


 


Plt Count  102 Th/cmm (150-400)  L  07/22/18  04:32    


 


MPV  7.2 fl  07/22/18  04:32    


 


Add Manual Diff  YES   07/22/18  04:32    


 


Band Neutrophils %  0 % (0-10)   07/22/18  04:32    


 


Neutrophils (Manual)  74 % (40-80)   07/22/18  04:32    


 


Lymphocytes  22 % (20-50)   07/22/18  04:32    


 


Monocytes  4 % (2-10)   07/22/18  04:32    


 


Eosinophils  0 % (0-5)   07/22/18  04:32    


 


Basophils  0 % (0-3)   07/22/18  04:32    


 


Platelet Estimate  ADEQUATE  (NORMAL)   07/22/18  04:32    


 


Platelet Morphology  NORMAL  (NORMAL)   07/22/18  04:32    


 


RBC Morph Micro Appear  NORMAL  (NORMAL)   07/22/18  04:32    


 


PT  14.5 SECONDS (9.5-11.5)  H  07/20/18  14:30    


 


INR  1.37  (0.5-1.4)   07/20/18  14:30    


 


Sodium  137 mEq/L (136-145)   07/22/18  04:32    


 


Potassium  3.3 mEq/L (3.5-5.1)  L  07/22/18  04:32    


 


Chloride  102 mEq/L ()   07/22/18  04:32    


 


Carbon Dioxide  28.8 mEq/L (21.0-31.0)   07/22/18  04:32    


 


Anion Gap  9.5  (7.0-16.0)   07/22/18  04:32    


 


BUN  6 mg/dL (7-25)  L  07/22/18  04:32    


 


Creatinine  0.6 mg/dL (0.6-1.2)   07/22/18  04:32    


 


Est GFR ( Amer)  > 60.0 ml/min (>90)   07/22/18  04:32    


 


Est GFR (Non-Af Amer)  > 60.0 ml/min  07/22/18  04:32    


 


BUN/Creatinine Ratio  10.0   07/22/18  04:32    


 


Glucose  100 mg/dL ()   07/22/18  04:32    


 


Calcium  8.5 mg/dL (8.6-10.3)  L  07/22/18  04:32    


 


Total Bilirubin  2.8 mg/dL (0.3-1.0)  H  07/22/18  04:32    


 


AST  50 U/L (13-39)  H  07/22/18  04:32    


 


ALT  12 U/L (7-52)   07/22/18  04:32    


 


Alkaline Phosphatase  123 U/L ()  H  07/22/18  04:32    


 


Creatine Kinase  546 U/L ()  H  07/20/18  14:30    


 


CK-MB (CK-2)  12.3 ng/mL (0.6-6.3)  H  07/20/18  14:30    


 


Troponin I  0.04 ng/mL (0.01-0.05)   07/20/18  14:30    


 


B-Natriuretic Peptide  219.0 pg/mL (5.0-100.0)  H  07/20/18  14:30    


 


Total Protein  6.1 gm/dL (6.0-8.3)   07/22/18  04:32    


 


Albumin  2.6 gm/dL (3.7-5.3)  L  07/22/18  04:32    


 


Globulin  3.5 gm/dL  07/22/18  04:32    


 


Albumin/Globulin Ratio  0.7  (1.0-1.8)  L  07/22/18  04:32    


 


Triglycerides  212 mg/dL (<150)  H  07/20/18  14:30    


 


Cholesterol  278 mg/dL (<200)  H  07/20/18  14:30    


 


LDL Cholesterol Direct  207 mg/dL ()  H  07/20/18  14:30    


 


HDL Cholesterol  22 mg/dL (23-92)  L  07/20/18  14:30    


 


Amylase  28 U/L ()  L  07/20/18  14:30    


 


Lipase  5 U/L (11-82)  L  07/20/18  14:30    


 


Serum Pregnancy, Qual  NEGATIVE  (NEGATIVE)   07/20/18  14:30    


 


Urine Source  CLEAN C   07/20/18  16:53    


 


Urine Color  YELLOW   07/20/18  16:53    


 


Urine Clarity  CLEAR  (CLEAR)   07/20/18  16:53    


 


Urine pH  6.0  (4.6 - 8.0)   07/20/18  16:53    


 


Ur Specific Gravity  <= 1.005  (1.005-1.030)   07/20/18  16:53    


 


Urine Protein  NEGATIVE mg/dL (NEGATIVE)   07/20/18  16:53    


 


Urine Glucose (UA)  NEGATIVE mg/dL (NEGATIVE)   07/20/18  16:53    


 


Urine Ketones  NEGATIVE mg/dL (NEGATIVE)   07/20/18  16:53    


 


Urine Blood  NEGATIVE  (NEGATIVE)   07/20/18  16:53    


 


Urine Nitrate  NEGATIVE  (NEGATIVE)   07/20/18  16:53    


 


Urine Bilirubin  NEGATIVE  (NEGATIVE)   07/20/18  16:53    


 


Urine Urobilinogen  0.2 E.U./dL (0.2 - 1.0)   07/20/18  16:53    


 


Ur Leukocyte Esterase  MODERATE  (NEGATIVE)  H  07/20/18  16:53    


 


Urine RBC  NONE SEEN /hpf (0-5)   07/20/18  16:53    


 


Urine WBC  2-5 /hpf (0-5)   07/20/18  16:53    


 


Ur Epithelial Cells  FEW /lpf (FEW)   07/20/18  16:53    


 


Urine Bacteria  NONE SEEN /hpf (NONE SEEN)   07/20/18  16:53    


 


Urine Mucus  FEW /lpf (FEW)   07/20/18  16:53    


 


Urine Pregnancy Test  NEGATIVE   07/20/18  16:53    


 


Urine Opiates Screen  POSITIVE  (NEGATIVE)  H  07/20/18  16:53    


 


Urine Methadone Screen  NEGATIVE  (NEGATIVE)   07/20/18  16:53    


 


Ur Barbiturates Screen  NEGATIVE  (NEGATIVE)   07/20/18  16:53    


 


Ur Tricyclics Screen  NEGATIVE  (NEGATIVE)   07/20/18  16:53    


 


Ur Phencyclidine Scrn  NEGATIVE  (NEGATIVE)   07/20/18  16:53    


 


Amphetamines Screen  NEGATIVE  (NEGATIVE)   07/20/18  16:53    


 


U Methamphetamines Scrn  NEGATIVE  (NEGATIVE)   07/20/18  16:53    


 


U Benzodiazepines Scrn  POSITIVE  (NEGATIVE)  H  07/20/18  16:53    


 


U Cocaine Metab Screen  NEGATIVE  (NEGATIVE)   07/20/18  16:53    


 


U Cannabinoids Screen  NEGATIVE  (NEGATIVE)   07/20/18  16:53    


 


Ethyl Alcohol  124 mg/dL (0-10)  H  07/20/18  14:30    














- Physical Exam


Vitals and I&O: 


 Vital Signs











Temp  96.5 F   07/22/18 07:00


 


Pulse  72   07/22/18 07:00


 


Resp  17   07/22/18 08:13


 


BP  122/67   07/22/18 07:00


 


Pulse Ox  99   07/22/18 07:00








 Intake & Output











 07/21/18 07/22/18 07/22/18





 18:59 06:59 18:59


 


Intake Total 1450 400 


 


Balance 1450 400 


 


Weight (lbs) 57.606 kg 62.596 kg 


 


Intake:   


 


  Intake, IV Amount 1000 200 


 


    D5-0.45NS 1,000 ml @ 80 1000  





    mls/hr IV .[id number] [facility]   





    #:[id number]   


 


    metroNIDAZOLE 500mg/NS  200 





    100mL 500 mg In 100 ml @   





    100 mls/hr IV Q8HR [facility]   





    #:[id number]   


 


  Oral 450 200 


 


Other:   


 


  # Voids 6 6 


 


  # Bowel Movements 6 0 


 


  Stool Characteristics  Liquid 





  Brown 


 


  Weight Source Estimated Bedscale 











Active Medications: 


Current Medications





Chlordiazepoxide (Librium)  50 mg PO Q6HR PRN; Protocol


   PRN Reason: Agitation


   Stop: 09/19/18 04:42


   Last Admin: 07/21/18 21:38 Dose:  50 mg


Dextrose/Sodium Chloride (D5-0.45ns)  1,000 mls @ 80 mls/hr IV .[id number] [facility]


   Stop: 09/18/18 22:59


   Last Admin: 07/21/18 22:04 Dose:  80 mls/hr


Ceftriaxone Sodium 1 gm/ (Dextrose)  50 mls @ 100 mls/hr IV Q24HR [facility]


   Stop: 09/19/18 04:42


   Last Admin: 07/21/18 09:41 Dose:  100 mls/hr


Metronidazole (Flagyl)  500 mg in 100 mls @ 100 mls/hr IV Q8HR [facility]


   Stop: 09/19/18 20:59


   Last Infusion: 07/22/18 06:08 Dose:  Infused


Ketorolac Tromethamine (Toradol)  15 mg IVP Q6HR PRN


   PRN Reason: Pain (Mild) LEVEL 1-3


   Stop: 09/19/18 04:42


   Last Admin: 07/22/18 03:14 Dose:  15 mg


Lactulose (Cephulac)  15 gm PO BID [facility]


   Stop: 09/20/18 08:59


Lorazepam (Ativan)  1 mg IVP Q4HR PRN; Protocol


   PRN Reason: Alcohol Withdrawal


   Stop: 09/18/18 20:24


   Last Admin: 07/20/18 20:58 Dose:  1 mg


Ondansetron HCl (Zofran)  4 mg IV Q6HR PRN


   PRN Reason: Nausea


   Stop: 09/19/18 04:42


Pantoprazole Sodium (Protonix)  40 mg IVP DAILY [facility]


   Stop: 09/19/18 08:59


   Last Admin: 07/21/18 08:34 Dose:  40 mg


Rifaximin (Xifaxan)  550 mg PO BID [facility]


   Stop: 09/20/18 08:59


Tramadol HCl (Ultram)  50 mg PO Q6HR PRN


   PRN Reason: Pain (Moderate) LEVEL 4-6


   Stop: 09/19/18 17:00


   Last Admin: 07/21/18 21:38 Dose:  50 mg








General: Alert, Oriented x3


Cardiovascular: Regular rate


Abdomen: Bowel sounds, Soft, Hepatomegaly, no Tender, no Rebound, no Guarding


Extremities: no Clubbing


Psych/Mental Status: Mental status NL





Assessment/Plan





- Problem List


Patient Problems: 


All Active Problems





ALCOHOL INGESTION, INABILITY TO AMBULATE (Acute) 











- Assessment


Assessment: 


# Cirrhosis secondary to alcoholism. Decompensated by HE and HCC


# Alcoholsim


# Hepatocellular carcinoma








Pt with HCC, followed by Dr Vega. She has had TACE previously, but this had 

been on hold as the pt has begun to drink again over the past 1-2 months. She 

was admitted ion 6/2018 for similar issues.





She has alcoholic hepatitis, and is still drinking. She has had EGD within the 

last year.





Plan:





- needs to stop EtOH in order to have more treatments for HCC. Follow with Dr Vega (her hepatologist)


- watch for EtOH withdrawal, she has had seizures before from this


- trend LFTs and CBC


- US performed 1 mo ago, does not need repeating now


- start diet, and advance as tolerated. Will do soft today


- no need to EGD now unless there is over bleeding


- start lactulose and rifaximin, send ammonia level in AM

## 2018-07-22 NOTE — CONSULTATION
DATE OF CONSULTATION:  07/22/2018



AGE:  52.



SEX:  Female.



PHYSICIAN:  Clovis Atkinson M.D.



CONSULTANT:  Jamal Jacobson M.D., M.P.H.



CHIEF COMPLAINT:  Heavy drinking.



HISTORY OF PRESENT ILLNESS:  The patient is a 52-year-old female who has been

drinking "2-3 glasses every day."  The patient has been falling at home and

the patient said that she has unsteady gait.  The patient has pancreatitis.  She

has been restless and has been agitated and unable to follow directions.  The

patient also is unmotivated to stop drinking.



PAST PSYCHIATRIC HISTORY:  The patient has history of alcoholism.



PAST MEDICAL HISTORY:  Liver cirrhosis and alcoholic hepatitis.



SOCIAL HISTORY:  The patient is  and lives with her .  She denies

any other drug use except alcohol.



MENTAL STATUS EXAM:  The patient appears older than her stated age.  Anxious. 

Sad affect.  In a depressed mood.  Thought process is goal directed.  She denies

hallucinations or delusions and she denied suicidal or homicidal ideations.  The

patient is alert and oriented to time, place, person, and situation.  Intact

immediate, recent, and remote memories.  Fair insight.  Judgment is poor.  Seems

to be of average intelligence based on her verbal ability.



ASSESSMENT/PRIMARY DIAGNOSIS:  Alcohol use disorder.



TREATMENT PLAN:  Continue detoxification using Librium and Ativan as already

ordered by Dr. Atkinson.  Also, the patient will need rehabilitation if she will

agree to do so.



Thanks to Dr. Atkinson.





DD: 07/22/2018 08:51

DT: 07/22/2018 10:22

[facility]# [id number]  [id number]

## 2018-07-22 NOTE — GENERAL PROGRESS NOTE
Subjective





- Review of Systems


Service Date: 07/22/18


Subjective: 





Patient seen and examined seems more calm and cooperative tolerating soft diet 

well wants to go home





Objective





- Results


Result Diagrams: 


 07/22/18 04:32





 07/22/18 04:32


Recent Labs: 


 Laboratory Last Values











WBC  4.1 Th/cmm (4.8-10.8)  L  07/22/18  04:32    


 


RBC  2.54 Mil/cmm (3.80-5.10)  L  07/22/18  04:32    


 


Hgb  8.5 gm/dL (12-16)  L  07/22/18  04:32    


 


Hct  25.7 % (41.0-60)  L  07/22/18  04:32    


 


MCV  101.3 fl ()  H  07/22/18  04:32    


 


MCH  33.4 pg (27.0-31.0)  H  07/22/18  04:32    


 


MCHC Differential  33.0 pg (28.0-36.0)   07/22/18  04:32    


 


RDW  13.5 % (11.5-20.0)   07/22/18  04:32    


 


Plt Count  102 Th/cmm (150-400)  L  07/22/18  04:32    


 


MPV  7.2 fl  07/22/18  04:32    


 


Add Manual Diff  YES   07/22/18  04:32    


 


Band Neutrophils %  0 % (0-10)   07/22/18  04:32    


 


Neutrophils (Manual)  74 % (40-80)   07/22/18  04:32    


 


Lymphocytes  22 % (20-50)   07/22/18  04:32    


 


Monocytes  4 % (2-10)   07/22/18  04:32    


 


Eosinophils  0 % (0-5)   07/22/18  04:32    


 


Basophils  0 % (0-3)   07/22/18  04:32    


 


Platelet Estimate  ADEQUATE  (NORMAL)   07/22/18  04:32    


 


Platelet Morphology  NORMAL  (NORMAL)   07/22/18  04:32    


 


RBC Morph Micro Appear  NORMAL  (NORMAL)   07/22/18  04:32    


 


PT  14.5 SECONDS (9.5-11.5)  H  07/20/18  14:30    


 


INR  1.37  (0.5-1.4)   07/20/18  14:30    


 


Sodium  137 mEq/L (136-145)   07/22/18  04:32    


 


Potassium  3.3 mEq/L (3.5-5.1)  L  07/22/18  04:32    


 


Chloride  102 mEq/L ()   07/22/18  04:32    


 


Carbon Dioxide  28.8 mEq/L (21.0-31.0)   07/22/18  04:32    


 


Anion Gap  9.5  (7.0-16.0)   07/22/18  04:32    


 


BUN  6 mg/dL (7-25)  L  07/22/18  04:32    


 


Creatinine  0.6 mg/dL (0.6-1.2)   07/22/18  04:32    


 


Est GFR ( Amer)  > 60.0 ml/min (>90)   07/22/18  04:32    


 


Est GFR (Non-Af Amer)  > 60.0 ml/min  07/22/18  04:32    


 


BUN/Creatinine Ratio  10.0   07/22/18  04:32    


 


Glucose  100 mg/dL ()   07/22/18  04:32    


 


Calcium  8.5 mg/dL (8.6-10.3)  L  07/22/18  04:32    


 


Total Bilirubin  2.8 mg/dL (0.3-1.0)  H  07/22/18  04:32    


 


AST  50 U/L (13-39)  H  07/22/18  04:32    


 


ALT  12 U/L (7-52)   07/22/18  04:32    


 


Alkaline Phosphatase  123 U/L ()  H  07/22/18  04:32    


 


Creatine Kinase  546 U/L ()  H  07/20/18  14:30    


 


CK-MB (CK-2)  12.3 ng/mL (0.6-6.3)  H  07/20/18  14:30    


 


Troponin I  0.04 ng/mL (0.01-0.05)   07/20/18  14:30    


 


B-Natriuretic Peptide  219.0 pg/mL (5.0-100.0)  H  07/20/18  14:30    


 


Total Protein  6.1 gm/dL (6.0-8.3)   07/22/18  04:32    


 


Albumin  2.6 gm/dL (3.7-5.3)  L  07/22/18  04:32    


 


Globulin  3.5 gm/dL  07/22/18  04:32    


 


Albumin/Globulin Ratio  0.7  (1.0-1.8)  L  07/22/18  04:32    


 


Triglycerides  212 mg/dL (<150)  H  07/20/18  14:30    


 


Cholesterol  278 mg/dL (<200)  H  07/20/18  14:30    


 


LDL Cholesterol Direct  207 mg/dL ()  H  07/20/18  14:30    


 


HDL Cholesterol  22 mg/dL (23-92)  L  07/20/18  14:30    


 


Amylase  28 U/L ()  L  07/20/18  14:30    


 


Lipase  5 U/L (11-82)  L  07/20/18  14:30    


 


Serum Pregnancy, Qual  NEGATIVE  (NEGATIVE)   07/20/18  14:30    


 


Urine Source  CLEAN C   07/20/18  16:53    


 


Urine Color  YELLOW   07/20/18  16:53    


 


Urine Clarity  CLEAR  (CLEAR)   07/20/18  16:53    


 


Urine pH  6.0  (4.6 - 8.0)   07/20/18  16:53    


 


Ur Specific Gravity  <= 1.005  (1.005-1.030)   07/20/18  16:53    


 


Urine Protein  NEGATIVE mg/dL (NEGATIVE)   07/20/18  16:53    


 


Urine Glucose (UA)  NEGATIVE mg/dL (NEGATIVE)   07/20/18  16:53    


 


Urine Ketones  NEGATIVE mg/dL (NEGATIVE)   07/20/18  16:53    


 


Urine Blood  NEGATIVE  (NEGATIVE)   07/20/18  16:53    


 


Urine Nitrate  NEGATIVE  (NEGATIVE)   07/20/18  16:53    


 


Urine Bilirubin  NEGATIVE  (NEGATIVE)   07/20/18  16:53    


 


Urine Urobilinogen  0.2 E.U./dL (0.2 - 1.0)   07/20/18  16:53    


 


Ur Leukocyte Esterase  MODERATE  (NEGATIVE)  H  07/20/18  16:53    


 


Urine RBC  NONE SEEN /hpf (0-5)   07/20/18  16:53    


 


Urine WBC  2-5 /hpf (0-5)   07/20/18  16:53    


 


Ur Epithelial Cells  FEW /lpf (FEW)   07/20/18  16:53    


 


Urine Bacteria  NONE SEEN /hpf (NONE SEEN)   07/20/18  16:53    


 


Urine Mucus  FEW /lpf (FEW)   07/20/18  16:53    


 


Urine Pregnancy Test  NEGATIVE   07/20/18  16:53    


 


Urine Opiates Screen  POSITIVE  (NEGATIVE)  H  07/20/18  16:53    


 


Urine Methadone Screen  NEGATIVE  (NEGATIVE)   07/20/18  16:53    


 


Ur Barbiturates Screen  NEGATIVE  (NEGATIVE)   07/20/18  16:53    


 


Ur Tricyclics Screen  NEGATIVE  (NEGATIVE)   07/20/18  16:53    


 


Ur Phencyclidine Scrn  NEGATIVE  (NEGATIVE)   07/20/18  16:53    


 


Amphetamines Screen  NEGATIVE  (NEGATIVE)   07/20/18  16:53    


 


U Methamphetamines Scrn  NEGATIVE  (NEGATIVE)   07/20/18  16:53    


 


U Benzodiazepines Scrn  POSITIVE  (NEGATIVE)  H  07/20/18  16:53    


 


U Cocaine Metab Screen  NEGATIVE  (NEGATIVE)   07/20/18  16:53    


 


U Cannabinoids Screen  NEGATIVE  (NEGATIVE)   07/20/18  16:53    


 


Ethyl Alcohol  124 mg/dL (0-10)  H  07/20/18  14:30    














- Physical Exam


Vitals and I&O: 


 Vital Signs











Temp  96.2 F   07/22/18 12:00


 


Pulse  72   07/22/18 12:00


 


Resp  18   07/22/18 12:00


 


BP  114/63   07/22/18 12:00


 


Pulse Ox  98   07/22/18 12:00








 Intake & Output











 07/21/18 07/22/18 07/22/18





 18:59 06:59 18:59


 


Intake Total 1500 400 


 


Balance 1500 400 


 


Weight (lbs) 57.606 kg 62.596 kg 


 


Intake:   


 


  Intake, IV Amount 1050 200 


 


    D5-0.45NS 1,000 ml @ 80 1000  





    mls/hr IV .[id number] [facility]   





    #:[id number]   


 


    cefTRIAXone 1 gm In 50  





    Dextrose 5% 50 ml @ 100   





    mls/hr IV Q24HR [facility] Rx#:   





    [id number]   


 


    metroNIDAZOLE 500mg/NS  200 





    100mL 500 mg In 100 ml @   





    100 mls/hr IV Q8HR [facility] Rx   





    #:[id number]   


 


  Oral 450 200 


 


Other:   


 


  # Voids 6 6 


 


  # Bowel Movements 6 0 


 


  Stool Characteristics  Liquid 





  Brown 


 


  Weight Source Estimated Bedscale 











Active Medications: 


Current Medications





Chlordiazepoxide (Librium)  50 mg PO Q6HR PRN; Protocol


   PRN Reason: Agitation


   Stop: 09/19/18 04:42


   Last Admin: 07/21/18 21:38 Dose:  50 mg


Ceftriaxone Sodium 1 gm/ (Dextrose)  50 mls @ 100 mls/hr IV Q24HR [facility]


   Stop: 09/19/18 04:42


   Last Admin: 07/22/18 09:44 Dose:  100 mls/hr


Ketorolac Tromethamine (Toradol)  15 mg IVP Q6HR PRN


   PRN Reason: Pain (Mild) LEVEL 1-3


   Stop: 09/19/18 04:42


   Last Admin: 07/22/18 03:14 Dose:  15 mg


Lactulose (Cephulac)  15 gm PO BID [facility]


   Stop: 09/20/18 08:59


   Last Admin: 07/22/18 09:44 Dose:  15 gm


Lorazepam (Ativan)  1 mg IVP Q4HR PRN; Protocol


   PRN Reason: Alcohol Withdrawal


   Stop: 09/18/18 20:24


   Last Admin: 07/20/18 20:58 Dose:  1 mg


Ondansetron HCl (Zofran)  4 mg IV Q6HR PRN


   PRN Reason: Nausea


   Stop: 09/19/18 04:42


Pantoprazole Sodium (Protonix)  40 mg IVP DAILY [facility]


   Stop: 09/19/18 08:59


   Last Admin: 07/22/18 09:45 Dose:  40 mg


Rifaximin (Xifaxan)  550 mg PO BID [facility]


   Stop: 09/20/18 08:59


   Last Admin: 07/22/18 09:45 Dose:  550 mg


Tramadol HCl (Ultram)  50 mg PO Q6HR PRN


   PRN Reason: Pain (Moderate) LEVEL 4-6


   Stop: 09/19/18 17:00


   Last Admin: 07/21/18 21:38 Dose:  50 mg








General: Alert


Cardiovascular: Regular rate


Lungs: Clear to auscultation


Abdomen: Soft, Guarding, no Tender, no Rebound


Extremities: Clubbing





Assessment/Plan





- Problem List


Patient Problems: 


All Active Problems





ALCOHOL INGESTION, INABILITY TO AMBULATE (Acute) 











- Assessment


Assessment: 





Alcohol intoxication


Hepatocellular carcinoma


Alcoholic liver cirrhosis


UTI





- Plan


Plan: 





Patient doing better


DC home with scripts


Highly advised for alcohol avoidance


Highly advised to follow up with her Liver specialist upon discharge


DC plan discussed with nursing staff

## 2018-07-22 NOTE — DIAGNOSTIC IMAGING REPORT
Exam: Ultrasound examination abdomen



HISTORY: Emesis.



Findings:



Real-time ultrasound examination of the abdomen was performed multiple

planes



The study demonstrates hepatomegaly with inhomogeneous liver parenchyma

throughout.



The liver spans 22 cm



There is evidence for 3.7 x 3.1 cm left liver lobe mass



There is evidence for 4.6 x 6.9 x 5.1 cm right lobe liver mass. 

Clinical correlation CT examination of the abdomen pelvis with contrast

material is recommended



The gallbladder contains a small calculi and the debris in the most

dependent portion gallbladder.  The common bile duct measures 6 mm



There is no evidence of pericholecystic fluid collection.  Gallbladder

wall is thickened.



There is no evidence of obstructive uropathy or nephrolithiasis.



There is evidence of splenomegaly the spleen measures 20 cm in span.



No free fluid is noted



IMPRESSION

1.  Hepatomegaly, liver masses most likely represent neoplastic

component CT examination with contrast recommended.



Gallbladder calculi and debris consistent with cholelithiasis, prominent

common bile duct at 6 mm.  Clinical correlation recommended.



Splenomegaly

## 2020-09-09 NOTE — GI PROGRESS NOTE
Subjective





- Review of Systems


Service Date: 06/22/18


Events since last encounter: 





No events


Subjective: 





No complaints





Objective





- Results


Result Diagrams: 


 06/21/18 05:45





 06/21/18 05:45


Recent Labs: 


 Laboratory Last Values











WBC  7.3 Th/cmm (4.8-10.8)   06/21/18  05:45    


 


RBC  2.67 Mil/cmm (3.80-5.10)  L  06/21/18  05:45    


 


Hgb  9.3 gm/dL (12-16)  L  06/21/18  05:45    


 


Hct  28.0 % (41.0-60)  L  06/21/18  05:45    


 


MCV  104.9 fl ()  H  06/21/18  05:45    


 


MCH  35.0 pg (27.0-31.0)  H  06/21/18  05:45    


 


MCHC Differential  33.4 pg (28.0-36.0)   06/21/18  05:45    


 


RDW  14.0 % (11.5-20.0)   06/21/18  05:45    


 


Plt Count  66 Th/cmm (150-400)  L  06/21/18  05:45    


 


MPV  7.4 fl  06/21/18  05:45    


 


Neutrophils %  75.1 % (40.0-80.0)   06/21/18  05:45    


 


Band Neutrophils %  1 % (0-10)   06/20/18  06:10    


 


Lymphocytes %  12.1 % (20.0-50.0)  L  06/21/18  05:45    


 


Monocytes %  11.3 % (2.0-10.0)  H  06/21/18  05:45    


 


Eosinophils %  0.8 % (0.0-5.0)   06/21/18  05:45    


 


Basophils %  0.7 % (0.0-2.0)   06/21/18  05:45    


 


Neutrophils (Manual)  85 % (40-80)  H  06/20/18  06:10    


 


Lymphocytes  10 % (20-50)  L  06/20/18  06:10    


 


Monocytes  4 % (2-10)   06/20/18  06:10    


 


Platelet Estimate  DECREASED PLATELETS  (NORMAL)   06/20/18  06:10    


 


Anisocytosis  2+   06/19/18  01:25    


 


Macrocytosis  1+   06/20/18  06:10    


 


PT  16.7 SECONDS (9.5-11.5)  H  06/19/18  01:25    


 


INR  1.57  (0.5-1.4)  H  06/19/18  01:25    


 


PTT (Actin FS)  33.0 SECONDS (26.0-38.0)   06/19/18  01:25    


 


Sodium  136 mEq/L (136-145)   06/21/18  05:45    


 


Potassium  3.5 mEq/L (3.5-5.1)   06/21/18  05:45    


 


Chloride  103 mEq/L ()   06/21/18  05:45    


 


Carbon Dioxide  25.2 mEq/L (21.0-31.0)   06/21/18  05:45    


 


Anion Gap  11.3  (7.0-16.0)   06/21/18  05:45    


 


BUN  7 mg/dL (7-25)   06/21/18  05:45    


 


Creatinine  0.7 mg/dL (0.6-1.2)   06/21/18  05:45    


 


Est GFR ( Amer)  > 60.0 ml/min (>90)   06/21/18  05:45    


 


Est GFR (Non-Af Amer)  > 60.0 ml/min  06/21/18  05:45    


 


BUN/Creatinine Ratio  10.0   06/21/18  05:45    


 


Glucose  102 mg/dL ()   06/21/18  05:45    


 


Hemoglobin A1c %  < 4.0 % (4.0-6.0)  L  06/19/18  01:25    


 


Calcium  9.3 mg/dL (8.6-10.3)   06/21/18  05:45    


 


Magnesium  2.0 mg/dL (1.9-2.7)   06/21/18  05:45    


 


Total Bilirubin  4.6 mg/dL (0.3-1.0)  H  06/21/18  05:45    


 


AST  95 U/L (13-39)  H  06/21/18  05:45    


 


ALT  28 U/L (7-52)   06/21/18  05:45    


 


Alkaline Phosphatase  202 U/L ()  H  06/21/18  05:45    


 


Ammonia  79 umol/L (16-53)  H  06/21/18  05:45    


 


Creatine Kinase  405 U/L ()  H  06/19/18  23:59    


 


CK-MB (CK-2)  8.3 ng/mL (0.6-6.3)  H  06/19/18  23:59    


 


Troponin I  0.03 ng/mL (0.01-0.05)   06/19/18  23:59    


 


Total Protein  7.0 gm/dL (6.0-8.3)   06/21/18  05:45    


 


Albumin  3.3 gm/dL (3.7-5.3)  L  06/21/18  05:45    


 


Globulin  3.7 gm/dL  06/21/18  05:45    


 


Albumin/Globulin Ratio  0.9  (1.0-1.8)  L  06/21/18  05:45    


 


Urine Source  RANDOM   06/19/18  06:00    


 


Urine Color  YELLOW   06/19/18  06:00    


 


Urine Clarity  CLEAR  (CLEAR)   06/19/18  06:00    


 


Urine pH  8.0  (4.6 - 8.0)   06/19/18  06:00    


 


Ur Specific Gravity  1.015  (1.005-1.030)   06/19/18  06:00    


 


Urine Protein  TRACE mg/dL (NEGATIVE)   06/19/18  06:00    


 


Urine Glucose (UA)  NEGATIVE mg/dL (NEGATIVE)   06/19/18  06:00    


 


Urine Ketones  NEGATIVE mg/dL (NEGATIVE)   06/19/18  06:00    


 


Urine Blood  TRACE  (NEGATIVE)   06/19/18  06:00    


 


Urine Nitrate  NEGATIVE  (NEGATIVE)   06/19/18  06:00    


 


Urine Bilirubin  NEGATIVE  (NEGATIVE)   06/19/18  06:00    


 


Urine Urobilinogen  0.2 E.U./dL (0.2 - 1.0)   06/19/18  06:00    


 


Ur Leukocyte Esterase  NEGATIVE  (NEGATIVE)   06/19/18  06:00    


 


Urine RBC  2-5 /hpf (0-5)   06/19/18  06:00    


 


Urine WBC  0-2 /hpf (0-5)   06/19/18  06:00    


 


Ur Epithelial Cells  FEW /lpf (FEW)   06/19/18  06:00    


 


Urine Bacteria  OCCASIONAL /hpf (NONE SEEN)   06/19/18  06:00    


 


Ethyl Alcohol  < 10 mg/dL (0-10)   06/19/18  01:25    


 


Blood Type  O POSITIVE   06/19/18  01:25    


 


Antibody Screen  NEGATIVE   06/19/18  01:25    














- Physical Exam


Vitals and I&O: 


 Vital Signs











Temp  97.6 F   06/22/18 05:00


 


Pulse  78   06/22/18 05:00


 


Resp  17   06/22/18 05:00


 


BP  110/67   06/22/18 05:00


 


Pulse Ox  98   06/22/18 05:00








 Intake & Output











 06/21/18 06/22/18 06/22/18





 18:59 06:59 18:59


 


Intake Total 250 200 


 


Output Total  3 


 


Balance 250 197 


 


Weight (lbs) 95.254 kg 95.254 kg 


 


Intake:   


 


  Oral 250 200 


 


Output:   


 


  Urine/Stool Mix  3 


 


Other:   


 


  Weight Source Estimated Estimated 











Active Medications: 


Current Medications





Acetaminophen/Hydrocodone Bitart (Norco 5mg/325mg)  1 tab PO Q6H PRN


   PRN Reason: Pain (Mild)


   Stop: 08/18/18 08:15


   Last Admin: 06/19/18 17:58 Dose:  1 tab


Chlordiazepoxide (Librium)  25 mg PO BID [facility]; Protocol


   Stop: 08/19/18 16:59


   Last Admin: 06/22/18 09:10 Dose:  25 mg


Folic Acid (Folate)  [facility] mg PO DAILY [facility]


   Stop: 08/18/18 09:29


   Last Admin: 06/22/18 09:10 Dose:  1 mg


Haloperidol (Haldol)  3 mg PO TID [facility]; Protocol


   Stop: 08/19/18 08:59


   Last Admin: 06/22/18 14:46 Dose:  3 mg


Dextrose/Sodium Chloride (D5-0.9%Ns)  1,000 mls @ 75 mls/hr IV .[id number] [facility]


   Stop: 08/18/18 09:29


   Last Admin: 06/19/18 22:44 Dose:  75 mls/hr


Lactulose (Cephulac)  30 gm PO TID [facility]


   Stop: 08/19/18 13:59


   Last Admin: 06/22/18 14:46 Dose:  30 gm


Lorazepam (Ativan)  2 mg IVP Q4HR PRN; Protocol


   PRN Reason: Alcohol Withdrawal


   Stop: 08/18/18 09:20


   Last Admin: 06/21/18 16:07 Dose:  2 mg


Lorazepam (Ativan)  1 mg IVP TID [facility]; Protocol


   Stop: 08/19/18 08:59


   Last Admin: 06/22/18 14:46 Dose:  1 mg


Mupirocin (Bactroban Oint)  1 appl NS BID [facility]


   Stop: 06/26/18 17:01


   Last Admin: 06/22/18 12:24 Dose:  1 appl


Rifaximin (Xifaxan)  600 mg [facility] BID [facility]


   Stop: 08/19/18 16:59


   Last Admin: 06/22/18 09:10 Dose:  600 mg


Thiamine HCl (Vitamin B1)  100 mg PO DAILY [facility]


   Stop: 08/18/18 09:29


   Last Admin: 06/22/18 09:10 Dose:  100 mg








General: Alert, Mild distress


Neck: Supple


Cardiovascular: Regular rate, Normal S1, Normal S2


Lungs: Clear to auscultation


Abdomen: Bowel sounds, Soft





Assessment/Plan





- Problem List


Patient Problems: 


All Active Problems





Alcohol abuse (Acute) F10.10


Fall at home (Acute) W19.XXXA, Y92.009


Hepatocellular carcinoma (Acute) 











- Plan


Plan: 





1.Alcohol abuse (Acute) F10.10\


Advised about stopping





Fall at home (Acute) W19.XXXA, Y92.009


per PCP





Hepatocellular carcinoma (Acute) 


S/P TACE. 


TACE on hold because of elevated bilirubin and ETOH


F/U with Dr Vega as OPT
Subjective





- Review of Systems


Service Date: 06/23/18


Subjective: 


Eating breakfast, feeling ok today.





Objective





- Results


Result Diagrams: 


 06/21/18 05:45





 06/21/18 05:45


Recent Labs: 


 Laboratory Last Values











WBC  7.3 Th/cmm (4.8-10.8)   06/21/18  05:45    


 


RBC  2.67 Mil/cmm (3.80-5.10)  L  06/21/18  05:45    


 


Hgb  9.3 gm/dL (12-16)  L  06/21/18  05:45    


 


Hct  28.0 % (41.0-60)  L  06/21/18  05:45    


 


MCV  104.9 fl ()  H  06/21/18  05:45    


 


MCH  35.0 pg (27.0-31.0)  H  06/21/18  05:45    


 


MCHC Differential  33.4 pg (28.0-36.0)   06/21/18  05:45    


 


RDW  14.0 % (11.5-20.0)   06/21/18  05:45    


 


Plt Count  66 Th/cmm (150-400)  L  06/21/18  05:45    


 


MPV  7.4 fl  06/21/18  05:45    


 


Neutrophils %  75.1 % (40.0-80.0)   06/21/18  05:45    


 


Band Neutrophils %  1 % (0-10)   06/20/18  06:10    


 


Lymphocytes %  12.1 % (20.0-50.0)  L  06/21/18  05:45    


 


Monocytes %  11.3 % (2.0-10.0)  H  06/21/18  05:45    


 


Eosinophils %  0.8 % (0.0-5.0)   06/21/18  05:45    


 


Basophils %  0.7 % (0.0-2.0)   06/21/18  05:45    


 


Neutrophils (Manual)  85 % (40-80)  H  06/20/18  06:10    


 


Lymphocytes  10 % (20-50)  L  06/20/18  06:10    


 


Monocytes  4 % (2-10)   06/20/18  06:10    


 


Platelet Estimate  DECREASED PLATELETS  (NORMAL)   06/20/18  06:10    


 


Anisocytosis  2+   06/19/18  01:25    


 


Macrocytosis  1+   06/20/18  06:10    


 


PT  16.7 SECONDS (9.5-11.5)  H  06/19/18  01:25    


 


INR  1.57  (0.5-1.4)  H  06/19/18  01:25    


 


PTT (Actin FS)  33.0 SECONDS (26.0-38.0)   06/19/18  01:25    


 


Sodium  136 mEq/L (136-145)   06/21/18  05:45    


 


Potassium  3.5 mEq/L (3.5-5.1)   06/21/18  05:45    


 


Chloride  103 mEq/L ()   06/21/18  05:45    


 


Carbon Dioxide  25.2 mEq/L (21.0-31.0)   06/21/18  05:45    


 


Anion Gap  11.3  (7.0-16.0)   06/21/18  05:45    


 


BUN  7 mg/dL (7-25)   06/21/18  05:45    


 


Creatinine  0.7 mg/dL (0.6-1.2)   06/21/18  05:45    


 


Est GFR ( Amer)  > 60.0 ml/min (>90)   06/21/18  05:45    


 


Est GFR (Non-Af Amer)  > 60.0 ml/min  06/21/18  05:45    


 


BUN/Creatinine Ratio  10.0   06/21/18  05:45    


 


Glucose  102 mg/dL ()   06/21/18  05:45    


 


Hemoglobin A1c %  < 4.0 % (4.0-6.0)  L  06/19/18  01:25    


 


Calcium  9.3 mg/dL (8.6-10.3)   06/21/18  05:45    


 


Magnesium  2.0 mg/dL (1.9-2.7)   06/21/18  05:45    


 


Total Bilirubin  4.6 mg/dL (0.3-1.0)  H  06/21/18  05:45    


 


AST  95 U/L (13-39)  H  06/21/18  05:45    


 


ALT  28 U/L (7-52)   06/21/18  05:45    


 


Alkaline Phosphatase  202 U/L ()  H  06/21/18  05:45    


 


Ammonia  79 umol/L (16-53)  H  06/21/18  05:45    


 


Creatine Kinase  405 U/L ()  H  06/19/18  23:59    


 


CK-MB (CK-2)  8.3 ng/mL (0.6-6.3)  H  06/19/18  23:59    


 


Troponin I  0.03 ng/mL (0.01-0.05)   06/19/18  23:59    


 


Total Protein  7.0 gm/dL (6.0-8.3)   06/21/18  05:45    


 


Albumin  3.3 gm/dL (3.7-5.3)  L  06/21/18  05:45    


 


Globulin  3.7 gm/dL  06/21/18  05:45    


 


Albumin/Globulin Ratio  0.9  (1.0-1.8)  L  06/21/18  05:45    


 


Urine Source  RANDOM   06/19/18  06:00    


 


Urine Color  YELLOW   06/19/18  06:00    


 


Urine Clarity  CLEAR  (CLEAR)   06/19/18  06:00    


 


Urine pH  8.0  (4.6 - 8.0)   06/19/18  06:00    


 


Ur Specific Gravity  1.015  (1.005-1.030)   06/19/18  06:00    


 


Urine Protein  TRACE mg/dL (NEGATIVE)   06/19/18  06:00    


 


Urine Glucose (UA)  NEGATIVE mg/dL (NEGATIVE)   06/19/18  06:00    


 


Urine Ketones  NEGATIVE mg/dL (NEGATIVE)   06/19/18  06:00    


 


Urine Blood  TRACE  (NEGATIVE)   06/19/18  06:00    


 


Urine Nitrate  NEGATIVE  (NEGATIVE)   06/19/18  06:00    


 


Urine Bilirubin  NEGATIVE  (NEGATIVE)   06/19/18  06:00    


 


Urine Urobilinogen  0.2 E.U./dL (0.2 - 1.0)   06/19/18  06:00    


 


Ur Leukocyte Esterase  NEGATIVE  (NEGATIVE)   06/19/18  06:00    


 


Urine RBC  2-5 /hpf (0-5)   06/19/18  06:00    


 


Urine WBC  0-2 /hpf (0-5)   06/19/18  06:00    


 


Ur Epithelial Cells  FEW /lpf (FEW)   06/19/18  06:00    


 


Urine Bacteria  OCCASIONAL /hpf (NONE SEEN)   06/19/18  06:00    


 


Ethyl Alcohol  < 10 mg/dL (0-10)   06/19/18  01:25    


 


Blood Type  O POSITIVE   06/19/18  01:25    


 


Antibody Screen  NEGATIVE   06/19/18  01:25    














- Physical Exam


Vitals and I&O: 


 Vital Signs











Temp  97.2 F   06/23/18 04:00


 


Pulse  92   06/23/18 04:00


 


Resp  18   06/23/18 04:00


 


BP  128/66   06/23/18 04:00


 


Pulse Ox  95   06/23/18 04:00








 Intake & Output











 06/22/18 06/23/18 06/23/18





 18:59 06:59 18:59


 


Intake Total 750 1128.75 


 


Balance 750 1128.75 


 


Weight (lbs) 95.254 kg 58.695 kg 


 


Intake:   


 


  Intake, IV Amount  928.75 


 


    D5-0.9%Ns 1,000 ml @ 75  928.75 





    mls/hr IV .[id number] [facility] Rx   





    #:[id number]   


 


  Oral 750 200 


 


Other:   


 


  # Voids 4 2 


 


  # Bowel Movements 2 2 


 


  Stool Characteristics Liquid Liquid 





 Brown Green 


 


  Weight Source Bedscale Bedscale 











Active Medications: 


Current Medications





Acetaminophen/Hydrocodone Bitart (Norco 5mg/325mg)  1 tab PO Q6H PRN


   PRN Reason: Pain (Mild)


   Stop: 08/18/18 08:15


   Last Admin: 06/19/18 17:58 Dose:  1 tab


Chlordiazepoxide (Librium)  25 mg PO BID [facility]; Protocol


   Stop: 08/19/18 16:59


   Last Admin: 06/22/18 18:05 Dose:  25 mg


Folic Acid (Folate)  1 mg PO DAILY [facility]


   Stop: 08/18/18 09:29


   Last Admin: 06/22/18 09:10 Dose:  1 mg


Haloperidol (Haldol)  3 mg PO TID [facility]; Protocol


   Stop: 08/19/18 08:59


   Last Admin: 06/22/18 20:41 Dose:  3 mg


Dextrose/Sodium Chloride (D5-0.9%Ns)  1,000 mls @ 75 mls/hr IV .[id number] [facility]


   Stop: 08/18/18 09:29


   Last Admin: 06/23/18 06:34 Dose:  75 mls/hr


Lactulose (Cephulac)  30 gm PO TID [facility]


   Stop: 08/19/18 13:59


   Last Admin: 06/22/18 20:41 Dose:  30 gm


Lorazepam (Ativan)  2 mg IVP Q4HR PRN; Protocol


   PRN Reason: Alcohol Withdrawal


   Stop: 08/18/18 09:20


   Last Admin: 06/21/18 16:07 Dose:  2 mg


Lorazepam (Ativan)  1 mg IVP TID [facility]; Protocol


   Stop: 08/19/18 08:59


   Last Admin: 06/22/18 22:19 Dose:  1 mg


Mupirocin (Bactroban Oint)  1 appl NS BID [facility]


   Stop: 06/26/18 17:01


   Last Admin: 06/22/18 18:05 Dose:  1 appl


Rifaximin (Xifaxan)  600 mg PO BID [facility]


   Stop: 08/19/18 16:59


   Last Admin: 06/22/18 18:05 Dose:  600 mg


Thiamine HCl (Vitamin B1)  100 mg PO DAILY [facility]


   Stop: 08/18/18 09:29


   Last Admin: 06/22/18 09:10 Dose:  100 mg








General: Alert, Mild distress


Neck: Supple


Cardiovascular: Regular rate, Normal S1, Normal S2


Lungs: Clear to auscultation


Abdomen: Bowel sounds, Soft, Hepatomegaly, Distended, no Tender, no Guarding





Assessment/Plan





- Problem List


Patient Problems: 


All Active Problems





Alcohol abuse (Acute) F10.10


Fall at home (Acute) W19.XXXA, Y92.009


Hepatocellular carcinoma (Acute) 











- Assessment


Assessment: 








#Alcohol abuse (Acute) F10.10\


- Advised about stopping





#Fall at home (Acute) W19.XXXA, Y92.009


- per PCP





#Hepatocellular carcinoma (Acute) 


- S/P TACE in the past. 


- TACE on hold because of elevated bilirubin and ETOH


- F/U with Dr Vega as OPT
Subjective





- Review of Systems


Service Date: 06/24/18


Subjective: 


No new events, some events of confusion





Objective





- Results


Result Diagrams: 


 06/24/18 05:50





 06/24/18 05:50


Recent Labs: 


 Laboratory Last Values











WBC  3.7 Th/cmm (4.8-10.8)  L  06/24/18  05:50    


 


RBC  2.52 Mil/cmm (3.80-5.10)  L  06/24/18  05:50    


 


Hgb  8.7 gm/dL (12-16)  L  06/24/18  05:50    


 


Hct  27.1 % (41.0-60)  L  06/24/18  05:50    


 


MCV  104.9 fl ()  H  06/21/18  05:45    


 


MCH  34.5 pg (27.0-31.0)  H  06/24/18  05:50    


 


MCHC Differential  32.2 pg (28.0-36.0)   06/24/18  05:50    


 


RDW  14.2 % (11.5-20.0)   06/24/18  05:50    


 


Plt Count  116 Th/cmm (150-400)  L  06/24/18  05:50    


 


MPV  7.0 fl  06/24/18  05:50    


 


Neutrophils %  75.1 % (40.0-80.0)   06/21/18  05:45    


 


Band Neutrophils %  2 % (0-10)   06/24/18  05:50    


 


Lymphocytes %  12.1 % (20.0-50.0)  L  06/21/18  05:45    


 


Monocytes %  11.3 % (2.0-10.0)  H  06/21/18  05:45    


 


Eosinophils %  0.8 % (0.0-5.0)   06/21/18  05:45    


 


Basophils %  0.7 % (0.0-2.0)   06/21/18  05:45    


 


Neutrophils (Manual)  69 % (40-80)   06/24/18  05:50    


 


Lymphocytes  22 % (20-50)   06/24/18  05:50    


 


Monocytes  3 % (2-10)   06/24/18  05:50    


 


Eosinophils  1 % (0-5)   06/24/18  05:50    


 


Basophils  3 % (0-3)   06/24/18  05:50    


 


Platelet Estimate  SLIGHT DECREASED  (NORMAL)   06/24/18  05:50    


 


Anisocytosis  1+   06/24/18  05:50    


 


Macrocytosis  1+   06/24/18  05:50    


 


PT  16.7 SECONDS (9.5-11.5)  H  06/19/18  01:25    


 


INR  1.57  (0.5-1.4)  H  06/19/18  01:25    


 


PTT (Actin FS)  33.0 SECONDS (26.0-38.0)   06/19/18  01:25    


 


Sodium  136 mEq/L (136-145)   06/24/18  05:50    


 


Potassium  3.2 mEq/L (3.5-5.1)  L  06/24/18  05:50    


 


Chloride  107 mEq/L ()   06/24/18  05:50    


 


Carbon Dioxide  18.9 mEq/L (21.0-31.0)  L  06/24/18  05:50    


 


Anion Gap  13.3  (7.0-16.0)   06/24/18  05:50    


 


BUN  6 mg/dL (7-25)  L  06/24/18  05:50    


 


Creatinine  0.5 mg/dL (0.6-1.2)  L  06/24/18  05:50    


 


Est GFR ( Amer)  > 60.0 ml/min (>90)   06/24/18  05:50    


 


Est GFR (Non-Af Amer)  > 60.0 ml/min  06/24/18  05:50    


 


BUN/Creatinine Ratio  12.0   06/24/18  05:50    


 


Glucose  106 mg/dL ()  H  06/24/18  05:50    


 


Hemoglobin A1c %  < 4.0 % (4.0-6.0)  L  06/19/18  01:25    


 


Calcium  8.4 mg/dL (8.6-10.3)  L  06/24/18  05:50    


 


Magnesium  2.0 mg/dL (1.9-2.7)   06/21/18  05:45    


 


Total Bilirubin  3.6 mg/dL (0.3-1.0)  H  06/24/18  05:50    


 


AST  49 U/L (13-39)  H  06/24/18  05:50    


 


ALT  17 U/L (7-52)   06/24/18  05:50    


 


Alkaline Phosphatase  150 U/L ()  H  06/24/18  05:50    


 


Ammonia  86 umol/L (16-53)  H  06/24/18  05:00    


 


Creatine Kinase  405 U/L ()  H  06/19/18  23:59    


 


CK-MB (CK-2)  8.3 ng/mL (0.6-6.3)  H  06/19/18  23:59    


 


Troponin I  0.03 ng/mL (0.01-0.05)   06/19/18  23:59    


 


Total Protein  6.1 gm/dL (6.0-8.3)   06/24/18  05:50    


 


Albumin  2.9 gm/dL (3.7-5.3)  L  06/24/18  05:50    


 


Globulin  3.2 gm/dL  06/24/18  05:50    


 


Albumin/Globulin Ratio  0.9  (1.0-1.8)  L  06/24/18  05:50    


 


Urine Source  RANDOM   06/19/18  06:00    


 


Urine Color  YELLOW   06/19/18  06:00    


 


Urine Clarity  CLEAR  (CLEAR)   06/19/18  06:00    


 


Urine pH  8.0  (4.6 - 8.0)   06/19/18  06:00    


 


Ur Specific Gravity  1.015  (1.005-1.030)   06/19/18  06:00    


 


Urine Protein  TRACE mg/dL (NEGATIVE)   06/19/18  06:00    


 


Urine Glucose (UA)  NEGATIVE mg/dL (NEGATIVE)   06/19/18  06:00    


 


Urine Ketones  NEGATIVE mg/dL (NEGATIVE)   06/19/18  06:00    


 


Urine Blood  TRACE  (NEGATIVE)   06/19/18  06:00    


 


Urine Nitrate  NEGATIVE  (NEGATIVE)   06/19/18  06:00    


 


Urine Bilirubin  NEGATIVE  (NEGATIVE)   06/19/18  06:00    


 


Urine Urobilinogen  0.2 E.U./dL (0.2 - 1.0)   06/19/18  06:00    


 


Ur Leukocyte Esterase  NEGATIVE  (NEGATIVE)   06/19/18  06:00    


 


Urine RBC  2-5 /hpf (0-5)   06/19/18  06:00    


 


Urine WBC  0-2 /hpf (0-5)   06/19/18  06:00    


 


Ur Epithelial Cells  FEW /lpf (FEW)   06/19/18  06:00    


 


Urine Bacteria  OCCASIONAL /hpf (NONE SEEN)   06/19/18  06:00    


 


Ethyl Alcohol  < 10 mg/dL (0-10)   06/19/18  01:25    


 


Blood Type  O POSITIVE   06/19/18  01:25    


 


Antibody Screen  NEGATIVE   06/19/18  01:25    














- Physical Exam


Vitals and I&O: 


 Vital Signs











Temp  98.4 F   06/24/18 07:48


 


Pulse  82   06/24/18 07:48


 


Resp  18   06/24/18 07:48


 


BP  117/65   06/24/18 07:48


 


Pulse Ox  91   06/24/18 07:48








 Intake & Output











 06/23/18 06/24/18 06/24/18





 18:59 06:59 18:59


 


Intake Total 650 200 


 


Balance 650 200 


 


Weight (lbs) 58.513 kg 58.513 kg 


 


Intake:   


 


  Oral 650 200 


 


Other:   


 


  # Voids 4 1 


 


  Stool Characteristics Liquid Liquid 





 Brown Brown 


 


  Weight Source Bedscale Bedscale 











Active Medications: 


Current Medications





Acetaminophen (Tylenol Extra Strength)  500 mg PO Q6H PRN


   PRN Reason: Fever > 101


   Stop: 08/22/18 17:53


   Last Admin: 06/23/18 18:33 Dose:  500 mg


Acetaminophen/Hydrocodone Bitart (Norco 5mg/325mg)  1 tab PO Q6H PRN


   PRN Reason: Pain (Mild)


   Stop: 08/18/18 08:15


   Last Admin: 06/19/18 17:58 Dose:  1 tab


Chlordiazepoxide (Librium)  25 mg PO BID [facility]; Protocol


   Stop: 08/19/18 16:59


   Last Admin: 06/23/18 18:33 Dose:  25 mg


Folic Acid (Folate)  1 mg PO DAILY [facility]


   Stop: 08/18/18 09:29


   Last Admin: 06/23/18 10:17 Dose:  1 mg


Haloperidol (Haldol)  3 mg PO TID [facility]; Protocol


   Stop: 08/19/18 08:59


   Last Admin: 06/23/18 21:07 Dose:  3 mg


Dextrose/Sodium Chloride (D5-0.9%Ns)  1,000 mls @ 75 mls/hr IV .[id number] [facility]


   Stop: 08/18/18 09:29


   Last Admin: 06/23/18 06:34 Dose:  75 mls/hr


Lactulose (Cephulac)  30 gm PO TID [facility]


   Stop: 08/19/18 13:59


   Last Admin: 06/23/18 21:22 Dose:  Not Given


Lorazepam (Ativan)  2 mg IVP Q4HR PRN; Protocol


   PRN Reason: Alcohol Withdrawal


   Stop: 08/18/18 09:20


   Last Admin: 06/21/18 16:07 Dose:  2 mg


Lorazepam (Ativan)  1 mg IVP TID [facility]; Protocol


   Stop: 08/19/18 08:59


   Last Admin: 06/23/18 21:08 Dose:  1 mg


Mupirocin (Bactroban Oint)  1 appl NS BID [facility]


   Stop: 06/26/18 17:01


   Last Admin: 06/23/18 18:34 Dose:  1 appl


Rifaximin (Xifaxan)  600 mg PO BID [facility]


   Stop: 08/19/18 16:59


   Last Admin: 06/23/18 18:33 Dose:  600 mg


Thiamine HCl (Vitamin B1)  100 mg PO DAILY [facility]


   Stop: 08/18/18 09:29


   Last Admin: 06/23/18 10:19 Dose:  100 mg








General: Alert, Mild distress


Neck: Supple


Cardiovascular: Regular rate


Lungs: Clear to auscultation


Abdomen: Bowel sounds, Soft, Hepatomegaly, Distended, no Tender, no Guarding





Assessment/Plan





- Problem List


Patient Problems: 


All Active Problems





Alcohol abuse (Acute) F10.10


Fall at home (Acute) W19.XXXA, Y92.009


Hepatocellular carcinoma (Acute) 











- Assessment


Assessment: 








#Alcohol abuse 


- Advised about stopping





#Fall at home 


- per PCP





#Hepatocellular carcinoma (Acute) 


# Cirrhosis





- S/P TACE in the past. 


- TACE on hold because of elevated bilirubin and ETOH use


- on lactulose and rifaximin


- F/U with Dr Vega as OPT
Subjective





- Review of Systems


Service Date: 06/25/18


Subjective: 


GI NOTE





EVENTS NOTED.


CONCHITA SOME ORAL DIET.





Objective





- Results


Result Diagrams: 


 06/24/18 05:50





 06/24/18 05:50


Recent Labs: 


 Laboratory Last Values











WBC  3.7 Th/cmm (4.8-10.8)  L  06/24/18  05:50    


 


RBC  2.52 Mil/cmm (3.80-5.10)  L  06/24/18  05:50    


 


Hgb  8.7 gm/dL (12-16)  L  06/24/18  05:50    


 


Hct  27.1 % (41.0-60)  L  06/24/18  05:50    


 


MCV  107.2 fl ()  H  06/24/18  05:50    


 


MCH  34.5 pg (27.0-31.0)  H  06/24/18  05:50    


 


MCHC Differential  32.2 pg (28.0-36.0)   06/24/18  05:50    


 


RDW  14.2 % (11.5-20.0)   06/24/18  05:50    


 


Plt Count  116 Th/cmm (150-400)  L  06/24/18  05:50    


 


MPV  7.0 fl  06/24/18  05:50    


 


Neutrophils %  75.1 % (40.0-80.0)   06/21/18  05:45    


 


Band Neutrophils %  2 % (0-10)   06/24/18  05:50    


 


Lymphocytes %  12.1 % (20.0-50.0)  L  06/21/18  05:45    


 


Monocytes %  11.3 % (2.0-10.0)  H  06/21/18  05:45    


 


Eosinophils %  0.8 % (0.0-5.0)   06/21/18  05:45    


 


Basophils %  0.7 % (0.0-2.0)   06/21/18  05:45    


 


Neutrophils (Manual)  69 % (40-80)   06/24/18  05:50    


 


Lymphocytes  22 % (20-50)   06/24/18  05:50    


 


Monocytes  3 % (2-10)   06/24/18  05:50    


 


Eosinophils  1 % (0-5)   06/24/18  05:50    


 


Basophils  3 % (0-3)   06/24/18  05:50    


 


Platelet Estimate  SLIGHT DECREASED  (NORMAL)   06/24/18  05:50    


 


Anisocytosis  1+   06/24/18  05:50    


 


Macrocytosis  1+   06/24/18  05:50    


 


PT  16.7 SECONDS (9.5-11.5)  H  06/19/18  01:25    


 


INR  1.57  (0.5-1.4)  H  06/19/18  01:25    


 


PTT (Actin FS)  33.0 SECONDS (26.0-38.0)   06/19/18  01:25    


 


Sodium  136 mEq/L (136-145)   06/24/18  05:50    


 


Potassium  3.2 mEq/L (3.5-5.1)  L  06/24/18  05:50    


 


Chloride  107 mEq/L ()   06/24/18  05:50    


 


Carbon Dioxide  18.9 mEq/L (21.0-31.0)  L  06/24/18  05:50    


 


Anion Gap  13.3  (7.0-16.0)   06/24/18  05:50    


 


BUN  6 mg/dL (7-25)  L  06/24/18  05:50    


 


Creatinine  0.5 mg/dL (0.6-1.2)  L  06/24/18  05:50    


 


Est GFR ( Amer)  > 60.0 ml/min (>90)   06/24/18  05:50    


 


Est GFR (Non-Af Amer)  > 60.0 ml/min  06/24/18  05:50    


 


BUN/Creatinine Ratio  12.0   06/24/18  05:50    


 


Glucose  106 mg/dL ()  H  06/24/18  05:50    


 


Hemoglobin A1c %  < 4.0 % (4.0-6.0)  L  06/19/18  01:25    


 


Calcium  8.4 mg/dL (8.6-10.3)  L  06/24/18  05:50    


 


Magnesium  2.0 mg/dL (1.9-2.7)   06/21/18  05:45    


 


Total Bilirubin  3.6 mg/dL (0.3-1.0)  H  06/24/18  05:50    


 


AST  49 U/L (13-39)  H  06/24/18  05:50    


 


ALT  17 U/L (7-52)   06/24/18  05:50    


 


Alkaline Phosphatase  150 U/L ()  H  06/24/18  05:50    


 


Ammonia  86 umol/L (16-53)  H  06/24/18  05:00    


 


Creatine Kinase  405 U/L ()  H  06/19/18  23:59    


 


CK-MB (CK-2)  8.3 ng/mL (0.6-6.3)  H  06/19/18  23:59    


 


Troponin I  0.03 ng/mL (0.01-0.05)   06/19/18  23:59    


 


Total Protein  6.1 gm/dL (6.0-8.3)   06/24/18  05:50    


 


Albumin  2.9 gm/dL (3.7-5.3)  L  06/24/18  05:50    


 


Globulin  3.2 gm/dL  06/24/18  05:50    


 


Albumin/Globulin Ratio  0.9  (1.0-1.8)  L  06/24/18  05:50    


 


Urine Source  RANDOM   06/19/18  06:00    


 


Urine Color  YELLOW   06/19/18  06:00    


 


Urine Clarity  CLEAR  (CLEAR)   06/19/18  06:00    


 


Urine pH  8.0  (4.6 - 8.0)   06/19/18  06:00    


 


Ur Specific Gravity  1.015  (1.005-1.030)   06/19/18  06:00    


 


Urine Protein  TRACE mg/dL (NEGATIVE)   06/19/18  06:00    


 


Urine Glucose (UA)  NEGATIVE mg/dL (NEGATIVE)   06/19/18  06:00    


 


Urine Ketones  NEGATIVE mg/dL (NEGATIVE)   06/19/18  06:00    


 


Urine Blood  TRACE  (NEGATIVE)   06/19/18  06:00    


 


Urine Nitrate  NEGATIVE  (NEGATIVE)   06/19/18  06:00    


 


Urine Bilirubin  NEGATIVE  (NEGATIVE)   06/19/18  06:00    


 


Urine Urobilinogen  0.2 E.U./dL (0.2 - 1.0)   06/19/18  06:00    


 


Ur Leukocyte Esterase  NEGATIVE  (NEGATIVE)   06/19/18  06:00    


 


Urine RBC  2-5 /hpf (0-5)   06/19/18  06:00    


 


Urine WBC  0-2 /hpf (0-5)   06/19/18  06:00    


 


Ur Epithelial Cells  FEW /lpf (FEW)   06/19/18  06:00    


 


Urine Bacteria  OCCASIONAL /hpf (NONE SEEN)   06/19/18  06:00    


 


Ethyl Alcohol  < 10 mg/dL (0-10)   06/19/18  01:25    


 


Blood Type  O POSITIVE   06/19/18  01:25    


 


Antibody Screen  NEGATIVE   06/19/18  01:25    














- Physical Exam


Vitals and I&O: 


 Vital Signs











Temp  98.6 F   06/25/18 17:51


 


Pulse  87   06/25/18 17:51


 


Resp  18   06/25/18 17:51


 


BP  99/55   06/25/18 17:51


 


Pulse Ox  97   06/25/18 17:51








 Intake & Output











 06/24/18 06/25/18 06/25/18





 18:59 06:59 18:59


 


Intake Total 650 1200 272.5


 


Balance 650 1200 272.5


 


Weight (lbs) 58.513 kg 58.513 kg 


 


Intake:   


 


  Intake, IV Amount  1000 272.5


 


    D5-0.9%Ns 1,000 ml @ 75  1000 272.5





    mls/hr IV .[id number] RADHA Rx   





    #:[id number]   


 


  Oral 650 200 


 


Other:   


 


  # Voids 4  


 


  # Bowel Movements 1  


 


  Stool Characteristics Liquid Soft 





 Brown  


 


  Weight Source Bedscale Estimated 











Active Medications: 


Current Medications





Acetaminophen (Tylenol Extra Strength)  500 mg PO Q6H PRN


   PRN Reason: Fever > 101


   Stop: 08/22/18 17:53


   Last Admin: 06/25/18 00:41 Dose:  500 mg


Acetaminophen/Hydrocodone Bitart (Norco 5mg/325mg)  1 tab PO Q6H PRN


   PRN Reason: Pain (Mild)


   Stop: 08/18/18 08:15


   Last Admin: 06/19/18 17:58 Dose:  1 tab


Chlordiazepoxide (Librium)  25 mg PO BID RADHA; Protocol


   Stop: 08/19/18 16:59


   Last Admin: 06/25/18 17:39 Dose:  25 mg


Folic Acid (Folate)  1 mg PO DAILY RADHA


   Stop: 08/18/18 09:29


   Last Admin: 06/25/18 09:01 Dose:  1 mg


Haloperidol (Haldol)  3 mg PO TID RADHA; Protocol


   Stop: 08/19/18 08:59


   Last Admin: 06/25/18 14:04 Dose:  3 mg


Dextrose/Sodium Chloride (D5-0.9%Ns)  1,000 mls @ 75 mls/hr IV .[id number] RADHA


   Stop: 08/18/18 09:29


   Last Admin: 06/25/18 17:46 Dose:  75 mls/hr


Lactulose (Cephulac)  30 gm PO TID RADHA


   Stop: 08/19/18 13:59


   Last Admin: 06/25/18 14:04 Dose:  30 gm


Lorazepam (Ativan)  2 mg IVP Q4HR PRN; Protocol


   PRN Reason: Alcohol Withdrawal


   Stop: 08/18/18 09:20


   Last Admin: 06/21/18 16:07 Dose:  2 mg


Lorazepam (Ativan)  1 mg IVP TID RADHA; Protocol


   Stop: 08/19/18 08:59


   Last Admin: 06/25/18 14:05 Dose:  1 mg


Mupirocin (Bactroban Oint)  1 appl NS BID RAD[facility]


   Stop: 06/26/18 17:01


   Last Admin: 06/25/18 17:39 Dose:  1 appl


Rifaximin (Xifaxan)  600 mg PO BID RADHA


   Stop: 08/19/18 16:59


   Last Admin: 06/25/18 17:39 Dose:  600 mg


Thiamine HCl (Vitamin B1)  100 mg PO DAILY RADHA


   Stop: 08/18/18 09:29


   Last Admin: 06/25/18 09:01 Dose:  100 mg








General: Alert, Mild distress


Neck: Supple


Cardiovascular: Regular rate


Lungs: Clear to auscultation


Abdomen: Bowel sounds, Soft, Hepatomegaly, Distended, no Tender, no Guarding





Assessment/Plan





- Problem List


Patient Problems: 


All Active Problems





Alcohol abuse (Acute) F10.10


Fall at home (Acute) W19.XXXA, Y92.009


Hepatocellular carcinoma (Acute) 











- Assessment


Assessment: 





IMPRESSION/RECOMMENDATIONS:





#Alcohol abuse, ACTIVE.


- Advised about stopping





#Fall at home 


- per PCP





#Hepatocellular carcinoma (Acute) 


# Cirrhosis





- S/P TACE in the past. 


- TACE on hold because of elevated bilirubin and ETOH use


- on lactulose and rifaximin


- F/U with Dr Vega as OPT


- ENCOURAGE ORAL DIETARY INTAKE.
Subjective





- Review of Systems


Service Date: 06/26/18


Subjective: 


GI NOTE





EVENTS NOTED.


CONCHITA ORAL DIET.





Objective





- Results


Result Diagrams: 


 06/24/18 05:50





 06/26/18 04:45


Recent Labs: 


 Laboratory Last Values











WBC  3.7 Th/cmm (4.8-10.8)  L  06/24/18  05:50    


 


RBC  2.52 Mil/cmm (3.80-5.10)  L  06/24/18  05:50    


 


Hgb  8.7 gm/dL (12-16)  L  06/24/18  05:50    


 


Hct  27.1 % (41.0-60)  L  06/24/18  05:50    


 


MCV  107.2 fl ()  H  06/24/18  05:50    


 


MCH  34.5 pg (27.0-31.0)  H  06/24/18  05:50    


 


MCHC Differential  32.2 pg (28.0-36.0)   06/24/18  05:50    


 


RDW  14.2 % (11.5-20.0)   06/24/18  05:50    


 


Plt Count  116 Th/cmm (150-400)  L  06/24/18  05:50    


 


MPV  7.0 fl  06/24/18  05:50    


 


Neutrophils %  75.1 % (40.0-80.0)   06/21/18  05:45    


 


Band Neutrophils %  2 % (0-10)   06/24/18  05:50    


 


Lymphocytes %  12.1 % (20.0-50.0)  L  06/21/18  05:45    


 


Monocytes %  11.3 % (2.0-10.0)  H  06/21/18  05:45    


 


Eosinophils %  0.8 % (0.0-5.0)   06/21/18  05:45    


 


Basophils %  0.7 % (0.0-2.0)   06/21/18  05:45    


 


Neutrophils (Manual)  69 % (40-80)   06/24/18  05:50    


 


Lymphocytes  22 % (20-50)   06/24/18  05:50    


 


Monocytes  3 % (2-10)   06/24/18  05:50    


 


Eosinophils  1 % (0-5)   06/24/18  05:50    


 


Basophils  3 % (0-3)   06/24/18  05:50    


 


Platelet Estimate  SLIGHT DECREASED  (NORMAL)   06/24/18  05:50    


 


Anisocytosis  1+   06/24/18  05:50    


 


Macrocytosis  1+   06/24/18  05:50    


 


PT  16.7 SECONDS (9.5-11.5)  H  06/19/18  01:25    


 


INR  1.57  (0.5-1.4)  H  06/19/18  01:25    


 


PTT (Actin FS)  33.0 SECONDS (26.0-38.0)   06/19/18  01:25    


 


Sodium  135 mEq/L (136-145)  L  06/26/18  04:45    


 


Potassium  3.9 mEq/L (3.5-5.1)   06/26/18  04:45    


 


Chloride  109 mEq/L ()  H  06/26/18  04:45    


 


Carbon Dioxide  17.8 mEq/L (21.0-31.0)  L  06/26/18  04:45    


 


Anion Gap  12.1  (7.0-16.0)   06/26/18  04:45    


 


BUN  4 mg/dL (7-25)  L  06/26/18  04:45    


 


Creatinine  0.5 mg/dL (0.6-1.2)  L  06/26/18  04:45    


 


Est GFR ( Amer)  > 60.0 ml/min (>90)   06/26/18  04:45    


 


Est GFR (Non-Af Amer)  > 60.0 ml/min  06/26/18  04:45    


 


BUN/Creatinine Ratio  8.0   06/26/18  04:45    


 


Glucose  108 mg/dL ()  H  06/26/18  04:45    


 


Hemoglobin A1c %  < 4.0 % (4.0-6.0)  L  06/19/18  01:25    


 


Calcium  8.5 mg/dL (8.6-10.3)  L  06/26/18  04:45    


 


Magnesium  2.0 mg/dL (1.9-2.7)   06/21/18  05:45    


 


Total Bilirubin  3.6 mg/dL (0.3-1.0)  H  06/24/18  05:50    


 


AST  49 U/L (13-39)  H  06/24/18  05:50    


 


ALT  17 U/L (7-52)   06/24/18  05:50    


 


Alkaline Phosphatase  150 U/L ()  H  06/24/18  05:50    


 


Ammonia  86 umol/L (16-53)  H  06/24/18  05:00    


 


Creatine Kinase  405 U/L ()  H  06/19/18  23:59    


 


CK-MB (CK-2)  8.3 ng/mL (0.6-6.3)  H  06/19/18  23:59    


 


Troponin I  0.03 ng/mL (0.01-0.05)   06/19/18  23:59    


 


Total Protein  6.1 gm/dL (6.0-8.3)   06/24/18  05:50    


 


Albumin  2.9 gm/dL (3.7-5.3)  L  06/24/18  05:50    


 


Globulin  3.2 gm/dL  06/24/18  05:50    


 


Albumin/Globulin Ratio  0.9  (1.0-1.8)  L  06/24/18  05:50    


 


Urine Source  RANDOM   06/19/18  06:00    


 


Urine Color  YELLOW   06/19/18  06:00    


 


Urine Clarity  CLEAR  (CLEAR)   06/19/18  06:00    


 


Urine pH  8.0  (4.6 - 8.0)   06/19/18  06:00    


 


Ur Specific Gravity  1.015  (1.005-1.030)   06/19/18  06:00    


 


Urine Protein  TRACE mg/dL (NEGATIVE)   06/19/18  06:00    


 


Urine Glucose (UA)  NEGATIVE mg/dL (NEGATIVE)   06/19/18  06:00    


 


Urine Ketones  NEGATIVE mg/dL (NEGATIVE)   06/19/18  06:00    


 


Urine Blood  TRACE  (NEGATIVE)   06/19/18  06:00    


 


Urine Nitrate  NEGATIVE  (NEGATIVE)   06/19/18  06:00    


 


Urine Bilirubin  NEGATIVE  (NEGATIVE)   06/19/18  06:00    


 


Urine Urobilinogen  0.2 E.U./dL (0.2 - 1.0)   06/19/18  06:00    


 


Ur Leukocyte Esterase  NEGATIVE  (NEGATIVE)   06/19/18  06:00    


 


Urine RBC  2-5 /hpf (0-5)   06/19/18  06:00    


 


Urine WBC  0-2 /hpf (0-5)   06/19/18  06:00    


 


Ur Epithelial Cells  FEW /lpf (FEW)   06/19/18  06:00    


 


Urine Bacteria  OCCASIONAL /hpf (NONE SEEN)   06/19/18  06:00    


 


Ethyl Alcohol  < 10 mg/dL (0-10)   06/19/18  01:25    


 


Blood Type  O POSITIVE   06/19/18  01:25    


 


Antibody Screen  NEGATIVE   06/19/18  01:25    














- Physical Exam


Vitals and I&O: 


 Vital Signs











Temp  99.1 F   06/26/18 09:34


 


Pulse  82   06/26/18 09:34


 


Resp  19   06/26/18 09:34


 


BP  124/73   06/26/18 09:34


 


Pulse Ox  94   06/26/18 09:34








 Intake & Output











 06/25/18 06/26/18 06/26/18





 18:59 06:59 18:59


 


Intake Total 272.5 1460 


 


Balance 272.5 1460 


 


Weight (lbs)  58.513 kg 


 


Intake:   


 


  Intake, IV Amount 272.5  


 


    D5-0.9%Ns 1,000 ml @ 75 272.5  





    mls/hr IV .[id number] RAD[facility] Rx   





    #:[id number]   


 


  Oral  1460 


 


Other:   


 


  # Voids  3 


 


  # Bowel Movements  2 


 


  Stool Characteristics   Soft


 


  Weight Source  Estimated 











Active Medications: 


Current Medications





Acetaminophen (Tylenol Extra Strength)  500 mg PO Q6H PRN


   PRN Reason: Fever > 101


   Stop: 08/22/18 17:53


   Last Admin: 06/25/18 00:41 Dose:  500 mg


Acetaminophen/Hydrocodone Bitart (Norco 5mg/325mg)  1 tab PO Q6H PRN


   PRN Reason: Pain (Mild)


   Stop: 08/18/18 08:15


   Last Admin: 06/19/18 17:58 Dose:  1 tab


Chlordiazepoxide (Librium)  25 mg PO BID RADHA; Protocol


   Stop: 08/19/18 16:59


   Last Admin: 06/26/18 08:36 Dose:  25 mg


Folic Acid (Folate)  1 mg PO DAILY RADHA


   Stop: 08/18/18 09:29


   Last Admin: 06/26/18 08:36 Dose:  1 mg


Haloperidol (Haldol)  3 mg PO TID RADHA; Protocol


   Stop: 08/19/18 08:59


   Last Admin: 06/26/18 08:35 Dose:  3 mg


Dextrose/Sodium Chloride (D5-0.9%Ns)  1,000 mls @ 75 mls/hr IV .[id number] RAD[facility]


   Stop: 08/18/18 09:29


   Last Admin: 06/25/18 17:46 Dose:  75 mls/hr


Lactulose (Cephulac)  30 gm PO TID RADHA


   Stop: 08/19/18 13:59


   Last Admin: 06/26/18 08:35 Dose:  30 gm


Lorazepam (Ativan)  2 mg IVP Q4HR PRN; Protocol


   PRN Reason: Alcohol Withdrawal


   Stop: 08/18/18 09:20


   Last Admin: 06/21/18 16:07 Dose:  2 mg


Lorazepam (Ativan)  1 mg IVP TID RADHA; Protocol


   Stop: 08/19/18 08:59


   Last Admin: 06/26/18 08:36 Dose:  1 mg


Mupirocin (Bactroban Oint)  1 appl NS BID RADHA


   Stop: 06/26/18 17:01


   Last Admin: 06/26/18 08:35 Dose:  1 appl


Rifaximin (Xifaxan)  600 mg PO BID RADHA


   Stop: 08/19/18 16:59


   Last Admin: 06/26/18 08:36 Dose:  600 mg


Thiamine HCl (Vitamin B1)  100 mg PO DAILY RADHA


   Stop: 08/18/18 09:29


   Last Admin: 06/26/18 08:35 Dose:  100 mg








General: Alert, Mild distress


Neck: Supple


Cardiovascular: Regular rate


Lungs: Clear to auscultation


Abdomen: Bowel sounds, Soft, Hepatomegaly, Distended, no Tender, no Guarding





Assessment/Plan





- Problem List


Patient Problems: 


All Active Problems





Alcohol abuse (Acute) F10.10


Fall at home (Acute) W19.XXXA, Y92.009


Hepatocellular carcinoma (Acute) 











- Assessment


Assessment: 





IMPRESSION/RECOMMENDATIONS:





#Alcohol abuse, active.


- Advised about stopping





#Fall at home 


- per PCP





#Hepatocellular carcinoma (Acute) 


# Cirrhosis





- S/P TACE in the past. 


- TACE on hold because of elevated bilirubin and ETOH use


- on lactulose and rifaximin


- F/U with Dr Vega as OPT


- ENCOURAGE ORAL DIETARY INTAKE.
Plan: If labs look great, we might increase the dose.
Continue Regimen: Acetretin 10mg qd
Render In Strict Bullet Format?: No
Detail Level: Zone
Initiate Treatment: Reminded him to discuss niacin use 500 bid, with his cardiologist